# Patient Record
Sex: MALE | Race: BLACK OR AFRICAN AMERICAN | Employment: FULL TIME | URBAN - METROPOLITAN AREA
[De-identification: names, ages, dates, MRNs, and addresses within clinical notes are randomized per-mention and may not be internally consistent; named-entity substitution may affect disease eponyms.]

---

## 2017-10-17 ENCOUNTER — HOSPITAL ENCOUNTER (EMERGENCY)
Age: 32
Discharge: HOME | End: 2017-10-17
Attending: EMERGENCY MEDICINE | Admitting: EMERGENCY MEDICINE
Payer: SELF-PAY

## 2017-10-17 ENCOUNTER — APPOINTMENT (OUTPATIENT)
Dept: GENERAL RADIOLOGY | Age: 32
End: 2017-10-17
Attending: EMERGENCY MEDICINE
Payer: SELF-PAY

## 2017-10-17 ENCOUNTER — APPOINTMENT (OUTPATIENT)
Dept: CT IMAGING | Age: 32
End: 2017-10-17
Attending: EMERGENCY MEDICINE
Payer: SELF-PAY

## 2017-10-17 VITALS
DIASTOLIC BLOOD PRESSURE: 96 MMHG | TEMPERATURE: 98.7 F | HEIGHT: 64 IN | RESPIRATION RATE: 15 BRPM | SYSTOLIC BLOOD PRESSURE: 132 MMHG | BODY MASS INDEX: 33.29 KG/M2 | HEART RATE: 77 BPM | WEIGHT: 195 LBS | OXYGEN SATURATION: 98 %

## 2017-10-17 DIAGNOSIS — R05.8 POST-TUSSIVE SYNCOPE: Primary | ICD-10-CM

## 2017-10-17 DIAGNOSIS — Z72.0 TOBACCO ABUSE: ICD-10-CM

## 2017-10-17 LAB
ALBUMIN SERPL-MCNC: 3.4 G/DL (ref 3.5–5)
ALBUMIN/GLOB SERPL: 1 {RATIO} (ref 1.1–2.2)
ALP SERPL-CCNC: 82 U/L (ref 45–117)
ALT SERPL-CCNC: 34 U/L (ref 12–78)
ANION GAP SERPL CALC-SCNC: 9 MMOL/L (ref 5–15)
AST SERPL-CCNC: 16 U/L (ref 15–37)
ATRIAL RATE: 75 BPM
BASOPHILS # BLD: 0 K/UL (ref 0–0.1)
BASOPHILS NFR BLD: 0 % (ref 0–1)
BILIRUB SERPL-MCNC: 0.3 MG/DL (ref 0.2–1)
BUN SERPL-MCNC: 13 MG/DL (ref 6–20)
BUN/CREAT SERPL: 15 (ref 12–20)
CALCIUM SERPL-MCNC: 8.5 MG/DL (ref 8.5–10.1)
CALCULATED P AXIS, ECG09: 76 DEGREES
CALCULATED R AXIS, ECG10: 69 DEGREES
CALCULATED T AXIS, ECG11: 27 DEGREES
CHLORIDE SERPL-SCNC: 104 MMOL/L (ref 97–108)
CO2 SERPL-SCNC: 28 MMOL/L (ref 21–32)
CREAT SERPL-MCNC: 0.84 MG/DL (ref 0.7–1.3)
D DIMER PPP FEU-MCNC: 1 MG/L FEU (ref 0–0.65)
DIAGNOSIS, 93000: NORMAL
EOSINOPHIL # BLD: 0.2 K/UL (ref 0–0.4)
EOSINOPHIL NFR BLD: 2 % (ref 0–7)
ERYTHROCYTE [DISTWIDTH] IN BLOOD BY AUTOMATED COUNT: 14.1 % (ref 11.5–14.5)
GLOBULIN SER CALC-MCNC: 3.4 G/DL (ref 2–4)
GLUCOSE SERPL-MCNC: 109 MG/DL (ref 65–100)
HCT VFR BLD AUTO: 37.7 % (ref 36.6–50.3)
HGB BLD-MCNC: 12.6 G/DL (ref 12.1–17)
LYMPHOCYTES # BLD: 2 K/UL (ref 0.8–3.5)
LYMPHOCYTES NFR BLD: 30 % (ref 12–49)
MCH RBC QN AUTO: 30.1 PG (ref 26–34)
MCHC RBC AUTO-ENTMCNC: 33.4 G/DL (ref 30–36.5)
MCV RBC AUTO: 90 FL (ref 80–99)
MONOCYTES # BLD: 0.5 K/UL (ref 0–1)
MONOCYTES NFR BLD: 7 % (ref 5–13)
NEUTS SEG # BLD: 4.2 K/UL (ref 1.8–8)
NEUTS SEG NFR BLD: 61 % (ref 32–75)
P-R INTERVAL, ECG05: 168 MS
PLATELET # BLD AUTO: 295 K/UL (ref 150–400)
POTASSIUM SERPL-SCNC: 3.8 MMOL/L (ref 3.5–5.1)
PROT SERPL-MCNC: 6.8 G/DL (ref 6.4–8.2)
Q-T INTERVAL, ECG07: 384 MS
QRS DURATION, ECG06: 94 MS
QTC CALCULATION (BEZET), ECG08: 428 MS
RBC # BLD AUTO: 4.19 M/UL (ref 4.1–5.7)
SODIUM SERPL-SCNC: 141 MMOL/L (ref 136–145)
VENTRICULAR RATE, ECG03: 75 BPM
WBC # BLD AUTO: 6.8 K/UL (ref 4.1–11.1)

## 2017-10-17 PROCEDURE — 74011250636 HC RX REV CODE- 250/636: Performed by: EMERGENCY MEDICINE

## 2017-10-17 PROCEDURE — 96361 HYDRATE IV INFUSION ADD-ON: CPT

## 2017-10-17 PROCEDURE — 85379 FIBRIN DEGRADATION QUANT: CPT | Performed by: EMERGENCY MEDICINE

## 2017-10-17 PROCEDURE — 80053 COMPREHEN METABOLIC PANEL: CPT | Performed by: EMERGENCY MEDICINE

## 2017-10-17 PROCEDURE — 36415 COLL VENOUS BLD VENIPUNCTURE: CPT | Performed by: EMERGENCY MEDICINE

## 2017-10-17 PROCEDURE — 71275 CT ANGIOGRAPHY CHEST: CPT

## 2017-10-17 PROCEDURE — 74011636320 HC RX REV CODE- 636/320: Performed by: EMERGENCY MEDICINE

## 2017-10-17 PROCEDURE — 77030029684 HC NEB SM VOL KT MONA -A

## 2017-10-17 PROCEDURE — 93005 ELECTROCARDIOGRAM TRACING: CPT

## 2017-10-17 PROCEDURE — 94640 AIRWAY INHALATION TREATMENT: CPT

## 2017-10-17 PROCEDURE — 85025 COMPLETE CBC W/AUTO DIFF WBC: CPT | Performed by: EMERGENCY MEDICINE

## 2017-10-17 PROCEDURE — 99283 EMERGENCY DEPT VISIT LOW MDM: CPT

## 2017-10-17 PROCEDURE — 71020 XR CHEST PA LAT: CPT

## 2017-10-17 PROCEDURE — 74011000250 HC RX REV CODE- 250: Performed by: EMERGENCY MEDICINE

## 2017-10-17 PROCEDURE — 96374 THER/PROPH/DIAG INJ IV PUSH: CPT

## 2017-10-17 RX ORDER — IPRATROPIUM BROMIDE AND ALBUTEROL SULFATE 2.5; .5 MG/3ML; MG/3ML
3 SOLUTION RESPIRATORY (INHALATION)
Status: COMPLETED | OUTPATIENT
Start: 2017-10-17 | End: 2017-10-17

## 2017-10-17 RX ORDER — ALBUTEROL SULFATE 90 UG/1
2 AEROSOL, METERED RESPIRATORY (INHALATION)
Qty: 1 INHALER | Refills: 0 | Status: SHIPPED | OUTPATIENT
Start: 2017-10-17 | End: 2018-07-08

## 2017-10-17 RX ORDER — NAPROXEN 500 MG/1
500 TABLET ORAL
Qty: 20 TAB | Refills: 0 | Status: SHIPPED | OUTPATIENT
Start: 2017-10-17 | End: 2018-07-08

## 2017-10-17 RX ORDER — PREDNISONE 20 MG/1
60 TABLET ORAL DAILY
Qty: 15 TAB | Refills: 0 | Status: SHIPPED | OUTPATIENT
Start: 2017-10-17 | End: 2017-10-22

## 2017-10-17 RX ORDER — SODIUM CHLORIDE 0.9 % (FLUSH) 0.9 %
5-10 SYRINGE (ML) INJECTION
Status: COMPLETED | OUTPATIENT
Start: 2017-10-17 | End: 2017-10-17

## 2017-10-17 RX ORDER — KETOROLAC TROMETHAMINE 30 MG/ML
30 INJECTION, SOLUTION INTRAMUSCULAR; INTRAVENOUS
Status: COMPLETED | OUTPATIENT
Start: 2017-10-17 | End: 2017-10-17

## 2017-10-17 RX ADMIN — Medication 10 ML: at 13:55

## 2017-10-17 RX ADMIN — SODIUM CHLORIDE 1000 ML: 900 INJECTION, SOLUTION INTRAVENOUS at 11:53

## 2017-10-17 RX ADMIN — IOPAMIDOL 100 ML: 755 INJECTION, SOLUTION INTRAVENOUS at 13:55

## 2017-10-17 RX ADMIN — IPRATROPIUM BROMIDE AND ALBUTEROL SULFATE 3 ML: .5; 3 SOLUTION RESPIRATORY (INHALATION) at 14:51

## 2017-10-17 RX ADMIN — KETOROLAC TROMETHAMINE 30 MG: 30 INJECTION, SOLUTION INTRAMUSCULAR at 14:29

## 2017-10-17 NOTE — ED NOTES
Patient here with c/p syncope and collapse. Patient states episode happened yesterday and over one week ago. Patient reports he was smoking a cigarette when he felt a little dizzy, then states that he woke up on the ground being helped up by family. Patient states that he has had no other symptoms. Denies previous medical history. Denies fevers. Denies changes to diet and denies changes to bathroom patterns. Emergency Department Nursing Plan of Care       The Nursing Plan of Care is developed from the Nursing assessment and Emergency Department Attending provider initial evaluation. The plan of care may be reviewed in the ED Provider note.     The Plan of Care was developed with the following considerations:   Patient / Family readiness to learn indicated by:verbalized understanding  Persons(s) to be included in education: patient  Barriers to Learning/Limitations:No    Signed     Mary Ann Macias RN    10/17/2017   11:13 AM

## 2017-10-17 NOTE — DISCHARGE INSTRUCTIONS
Fainting: Care Instructions  Your Care Instructions    When you faint, or pass out, you lose consciousness for a short time. A brief drop in blood flow to the brain often causes it. When you fall or lie down, more blood flows to your brain and you regain consciousness. Emotional stress, pain, or overheating--especially if you have been standing--can make you faint. In these cases, fainting is usually not serious. But fainting can be a sign of a more serious problem. Your doctor may want you to have more tests to rule out other causes. The treatment you need depends on the reason why you fainted. The doctor has checked you carefully, but problems can develop later. If you notice any problems or new symptoms, get medical treatment right away. Follow-up care is a key part of your treatment and safety. Be sure to make and go to all appointments, and call your doctor if you are having problems. It's also a good idea to know your test results and keep a list of the medicines you take. How can you care for yourself at home? · Drink plenty of fluids to prevent dehydration. If you have kidney, heart, or liver disease and have to limit fluids, talk with your doctor before you increase your fluid intake. When should you call for help? Call 911 anytime you think you may need emergency care. For example, call if:  · You have symptoms of a heart problem. These may include:  ¨ Chest pain or pressure. ¨ Severe trouble breathing. ¨ A fast or irregular heartbeat. ¨ Lightheadedness or sudden weakness. ¨ Coughing up pink, foamy mucus. ¨ Passing out. After you call 911, the  may tell you to chew 1 adult-strength or 2 to 4 low-dose aspirin. Wait for an ambulance. Do not try to drive yourself. · You have symptoms of a stroke. These may include:  ¨ Sudden numbness, tingling, weakness, or loss of movement in your face, arm, or leg, especially on only one side of your body. ¨ Sudden vision changes.   ¨ Sudden trouble speaking. ¨ Sudden confusion or trouble understanding simple statements. ¨ Sudden problems with walking or balance. ¨ A sudden, severe headache that is different from past headaches. · You passed out (lost consciousness) again. Watch closely for changes in your health, and be sure to contact your doctor if:  · You do not get better as expected. Where can you learn more? Go to http://rosa-yamini.info/. Enter J625 in the search box to learn more about \"Fainting: Care Instructions. \"  Current as of: March 20, 2017  Content Version: 11.3  © 6545-9284 [employer]. Care instructions adapted under license by [employer] (which disclaims liability or warranty for this information). If you have questions about a medical condition or this instruction, always ask your healthcare professional. [address] any warranty or liability for your use of this information. Cough: Care Instructions  Your Care Instructions  A cough is your body's response to something that bothers your throat or airways. Many things can cause a cough. You might cough because of a cold or the flu, bronchitis, or asthma. Smoking, postnasal drip, allergies, and stomach acid that backs up into your throat also can cause coughs. A cough is a symptom, not a disease. Most coughs stop when the cause, such as a cold, goes away. You can take a few steps at home to cough less and feel better. Follow-up care is a key part of your treatment and safety. Be sure to make and go to all appointments, and call your doctor if you are having problems. It's also a good idea to know your test results and keep a list of the medicines you take. How can you care for yourself at home? · Drink lots of water and other fluids. This helps thin the mucus and soothes a dry or sore throat. Honey or lemon juice in hot water or tea may ease a dry cough.   · Take cough medicine as directed by your doctor. · Prop up your head on pillows to help you breathe and ease a dry cough. · Try cough drops to soothe a dry or sore throat. Cough drops don't stop a cough. Medicine-flavored cough drops are no better than candy-flavored drops or hard candy. · Do not smoke. Avoid secondhand smoke. If you need help quitting, talk to your doctor about stop-smoking programs and medicines. These can increase your chances of quitting for good. When should you call for help? Call 911 anytime you think you may need emergency care. For example, call if:  · You have severe trouble breathing. Call your doctor now or seek immediate medical care if:  · You cough up blood. · You have new or worse trouble breathing. · You have a new or higher fever. · You have a new rash. Watch closely for changes in your health, and be sure to contact your doctor if:  · You cough more deeply or more often, especially if you notice more mucus or a change in the color of your mucus. · You have new symptoms, such as a sore throat, an earache, or sinus pain. · You do not get better as expected. Where can you learn more? Go to http://rosa-yamini.info/. Enter D279 in the search box to learn more about \"Cough: Care Instructions. \"  Current as of: March 25, 2017  Content Version: 11.3  © 2282-0669 [employer]o. Care instructions adapted under license by [employer] (which disclaims liability or warranty for this information). If you have questions about a medical condition or this instruction, always ask your healthcare professional. [place] [address] any warranty or liability for your use of this information. Learning About Benefits From Quitting Smoking  How does quitting smoking make you healthier? If you're thinking about quitting smoking, you may have a few reasons to be smoke-free. Your health may be one of them.   · When you quit smoking, you lower your risks for cancer, lung disease, heart attack, stroke, blood vessel disease, and blindness from macular degeneration. · When you're smoke-free, you get sick less often, and you heal faster. You are less likely to get colds, flu, bronchitis, and pneumonia. · As a nonsmoker, you may find that your mood is better and you are less stressed. When and how will you feel healthier? Quitting has real health benefits that start from day 1 of being smoke-free. And the longer you stay smoke-free, the healthier you get and the better you feel. The first hours  · After just 20 minutes, your blood pressure and heart rate go down. That means there's less stress on your heart and blood vessels. · Within 12 hours, the level of carbon monoxide in your blood drops back to normal. That makes room for more oxygen. With more oxygen in your body, you may notice that you have more energy than when you smoked. After 2 weeks  · Your lungs start to work better. · Your risk of heart attack starts to drop. After 1 month  · When your lungs are clear, you cough less and breathe deeper, so it's easier to be active. · Your sense of taste and smell return. That means you can enjoy food more than you have since you started smoking. Over the years  · After 1 year, your risk of heart disease is half what it would be if you kept smoking. · After 5 years, your risk of stroke starts to shrink. Within a few years after that, it's about the same as if you'd never smoked. · After 10 years, your risk of dying from lung cancer is cut by about half. And your risk for many other types of cancer is lower too. How would quitting help others in your life? When you quit smoking, you improve the health of everyone who now breathes in your smoke. · Their heart, lung, and cancer risks drop, much like yours. · They are sick less. For babies and small children, living smoke-free means they're less likely to have ear infections, pneumonia, and bronchitis.   · If you're a woman who is or will be pregnant someday, quitting smoking means a healthier . · Children who are close to you are less likely to become adult smokers. Where can you learn more? Go to http://rosa-yamini.info/. Enter 052 [id number] [id number] 11 in the search box to learn more about \"Learning About Benefits From Quitting Smoking. \"  Current as of: 2017  Content Version: 11.3  © 7131-0445 R[employer]. Care instructions adapted under license by [employer] (which disclaims liability or warranty for this information). If you have questions about a medical condition or this instruction, always ask your healthcare professional. [place] [address] any warranty or liability for your use of this information. Stopping Smoking: Care Instructions  Your Care Instructions  Cigarette smokers crave the nicotine in cigarettes. Giving it up is much harder than simply changing a habit. Your body has to stop craving the nicotine. It is hard to quit, but you can do it. There are many tools that people use to quit smoking. You may find that combining tools works best for you. There are several steps to quitting. First you get ready to quit. Then you get support to help you. After that, you learn new skills and behaviors to become a nonsmoker. For many people, a necessary step is getting and using medicine. Your doctor will help you set up the plan that best meets your needs. You may want to attend a smoking cessation program to help you quit smoking. When you choose a program, look for one that has proven success. Ask your doctor for ideas. You will greatly increase your chances of success if you take medicine as well as get counseling or join a cessation program.  Some of the changes you feel when you first quit tobacco are uncomfortable. Your body will miss the nicotine at first, and you may feel short-tempered and grumpy. You may have trouble sleeping or concentrating.  Medicine can help you deal with these symptoms. You may struggle with changing your smoking habits and rituals. The last step is the tricky one: Be prepared for the smoking urge to continue for a time. This is a lot to deal with, but keep at it. You will feel better. Follow-up care is a key part of your treatment and safety. Be sure to make and go to all appointments, and call your doctor if you are having problems. Its also a good idea to know your test results and keep a list of the medicines you take. How can you care for yourself at home? · Ask your family, friends, and coworkers for support. You have a better chance of quitting if you have help and support. · Join a support group, such as Nicotine Anonymous, for people who are trying to quit smoking. · Consider signing up for a smoking cessation program, such as the American Lung Association's Freedom from Smoking program.  · Set a quit date. Pick your date carefully so that it is not right in the middle of a big deadline or stressful time. Once you quit, do not even take a puff. Get rid of all ashtrays and lighters after your last cigarette. Clean your house and your clothes so that they do not smell of smoke. · Learn how to be a nonsmoker. Think about ways you can avoid those things that make you reach for a cigarette. ¨ Avoid situations that put you at greatest risk for smoking. For some people, it is hard to have a drink with friends without smoking. For others, they might skip a coffee break with coworkers who smoke. ¨ Change your daily routine. Take a different route to work or eat a meal in a different place. · Cut down on stress. Calm yourself or release tension by doing an activity you enjoy, such as reading a book, taking a hot bath, or gardening. · Talk to your doctor or pharmacist about nicotine replacement therapy, which replaces the nicotine in your body.  You still get nicotine but you do not use tobacco. Nicotine replacement products help you slowly reduce the amount of nicotine you need. These products come in several forms, many of them available over-the-counter:  ¨ Nicotine patches  ¨ Nicotine gum and lozenges  ¨ Nicotine inhaler  · Ask your doctor about bupropion (Wellbutrin) or varenicline (Chantix), which are prescription medicines. They do not contain nicotine. They help you by reducing withdrawal symptoms, such as stress and anxiety. · Some people find hypnosis, acupuncture, and massage helpful for ending the smoking habit. · Eat a healthy diet and get regular exercise. Having healthy habits will help your body move past its craving for nicotine. · Be prepared to keep trying. Most people are not successful the first few times they try to quit. Do not get mad at yourself if you smoke again. Make a list of things you learned and think about when you want to try again, such as next week, next month, or next year. Where can you learn more? Go to http://rosa-yamini.info/. Enter S793 in the search box to learn more about \"Stopping Smoking: Care Instructions. \"  Current as of: March 20, 2017  Content Version: 11.3  © 2690-9177 [employer]. Care instructions adapted under license by [employer] (which disclaims liability or warranty for this information). If you have questions about a medical condition or this instruction, always ask your healthcare professional. [address] any warranty or liability for your use of this information.

## 2017-10-17 NOTE — ED NOTES
Patient (s)  given copy of dc instructions and  paper script(s) and 3 electronic scripts. Patient (s)  verbalized understanding of instructions and script (s). Patient given a current medication reconciliation form and verbalized understanding of their medications. Patient (s) verbalized understanding of the importance of discussing medications with his or her physician or clinic they will be following up with. Patient alert and oriented and in no acute distress. Patient offered wheelchair from treatment area to hospital entrance, patient decline wheelchair.

## 2017-10-17 NOTE — Clinical Note
[facility] Departments For adult and child immunizations, family planning, TB screening, STD testing and women's health services. [place]: [place] [phone] [employer] [phone] [facility]   [phone] [facility]   [phone] Anita Castañeda   [phone] [place]: [facility] [phone] [place] [phone] Kb Campos [phone] [address] For primary care services, woman and child wellness, and some clinic s providing specialty care. [facility] -- 1011 [facility]. [address] [place] [address] [phone] [facility] 200 [facility] [phone] 1328 [facility] 110 [facility] [phone] 1039 [facility] 5850 [facility]  [phone] 7700 [facility] [phone] [facility] 81 [facility] [phone] [facility] 1051 [facility], 809 [facility]: [facility] [address], ext [phone] [facility], [address] [phone] [facility] 37 [phone] [address] 5850 [facility]  [phone] [employer]  [address], [phone] 3553 [facility] ([email or web address]) [phone] Sexual Health/Woman Wellness Clinics For STD/HIV testing and treatment, pregnancy testing and services, men's health, birth control services and hepatitis/HPV vaccine services. [facility] [address] [phone] [facility] Quinten Malik [phone] [phone] [address] [place] [address] Rd, 5th floor [phone] [facility] [address] [phone] [employer] [facility] [employer] [address] [employer] [phone] Special[facility] [address] [place] [address] [phone] [address] [facility] [address]   [phone] [facility]   [phone] Women, Infant and Children's Services: [address] [phone] [address] [phone] 128 [facility] 66 [facility]     [phone] [facility]   [phone] 562 [facility] [phone] [facility] [phone] Rich Campbell M.D. Kendall Carney M.D. JONATHAN Booker M.D. MD Anastasiia Sky, FNP Shabana Low, TOÑITO Low, P TOÑITO Gibson MD Liza Glasgow, ROJELIO [address] [phone] April Renetta MD 
[facility] MD Bertha Gregg MD [facility] [phone] MD Renee Fournier MD Renaye Lace, MD [employer], MD Tom Henry, MD        [phone] Ximena Lang MD               [phone] Pawel Salinas MD      [phone] [facility] [phone] MD Ronna Lebron MD Rejeana Meyer, MD 
 [facility] [phone]

## 2017-10-17 NOTE — ED PROVIDER NOTES
[address] [place] [address]  EMERGENCY DEPARTMENT HISTORY AND PHYSICAL EXAM         Date of Service: 10/17/2017   Patient Name: Haile Lugo   YOB: 1985  Medical Record Number: [id number]    History of Presenting Illness     Chief Complaint   Patient presents with    Syncope        History Provided By:  patient    Additional History:   Haile Lugo is a 34 y.o. male who presents ambulatory to the ED with cc of posttussive syncopal episode yesterday. Pt states he was standing and talking to his brother when the he began coughing, and he fell to the floor and had to be awoken by his brother. Pt states he awoke this morning with moderate chest pain and tingling in his left arm. He notes a similar similar episode 2 weeks ago while he was smoking a cigarette and began coughing then lost consciousness. Pt denies any light headedness, dizziness, visual disturbance, or seizure. Social Hx: + Tobacco, + EtOH, - Illicit Drugs    There are no other complaints, changes or physical findings at this time. Primary Care Provider: None     Past History     Past Medical History:   History reviewed. No pertinent past medical history. Past Surgical History:   History reviewed. No pertinent surgical history. Family History:   History reviewed. No pertinent family history. Social History:   Social History   Substance Use Topics    Smoking status: Current Some Day Smoker     Packs/day: 1.00    Smokeless tobacco: Never Used    Alcohol use 3.5 oz/week     7 Cans of beer per week        Allergies: Allergies   Allergen Reactions    Pcn [Penicillins] Anaphylaxis        Review of Systems   Review of Systems   Constitutional: Negative for chills and fever. HENT: Negative for congestion, rhinorrhea, sneezing and sore throat. Eyes: Negative for redness and visual disturbance. Respiratory: Positive for cough. Negative for shortness of breath.     Cardiovascular: Positive for chest pain. Negative for leg swelling. Gastrointestinal: Negative for abdominal pain, nausea and vomiting. Genitourinary: Negative for difficulty urinating and frequency. Musculoskeletal: Negative for back pain, myalgias and neck stiffness. Skin: Negative for rash. Neurological: Positive for syncope. Negative for dizziness, weakness and headaches. Hematological: Negative for adenopathy. Physical Exam  Physical Exam   Constitutional: He is oriented to person, place, and time. He appears well-developed and well-nourished. HENT:   Head: Normocephalic and atraumatic. Mouth/Throat: Oropharynx is clear and moist and mucous membranes are normal.   Eyes: EOM are normal.   Neck: Normal range of motion and full passive range of motion without pain. Neck supple. Cardiovascular: Normal rate, regular rhythm, normal heart sounds, intact distal pulses and normal pulses. No murmur heard. Pulmonary/Chest: Effort normal. No respiratory distress. He has decreased breath sounds. He exhibits no tenderness. Decreased breath sounds in all lung fields    Abdominal: Soft. Normal appearance and bowel sounds are normal. There is no tenderness. There is no rebound and no guarding. Neurological: He is alert and oriented to person, place, and time. He has normal strength. Skin: Skin is warm, dry and intact. No rash noted. No erythema. Psychiatric: He has a normal mood and affect. His speech is normal and behavior is normal. Judgment and thought content normal.   Nursing note and vitals reviewed. Medical Decision Making   I am the first provider for this patient. I reviewed the vital signs, available nursing notes, past medical history, past surgical history, family history and social history. Provider Notes: DDx: vasovagal syncope, posttussive syncope, PE, pneumonia, AAA     TOBACCO COUNSELING:  Upon evaluation, pt expressed that they are a current tobacco user.  Pt has been counseled on the dangers of smoking and was encouraged to quit as soon as possible in order to decrease further risks to their health. Pt has conveyed their understanding of the risks involved should they continue to use tobacco products. ED Course:  11:23 AM   Initial assessment performed. The patients presenting problems have been discussed, and they are in agreement with the care plan formulated and outlined with them. I have encouraged them to ask questions as they arise throughout their visit. EKG interpretation: (Preliminary) 11:08 AM  Rhythm: normal sinus rhythm; and regular . Rate (approx.): 75; Axis: normal; P wave: normal; QRS interval: normal ; ST/T wave: normal;   Written by Charlie Smiley. Nestor Hanson, ED Scribe, as dictated by Yuko Lynch MD.     Labs reassuring. Will discharge with PCP follow up. Diagnostic Study Results   Labs -      Recent Results (from the past 12 hour(s))   EKG, 12 LEAD, INITIAL    Collection Time: 10/17/17 11:08 AM   Result Value Ref Range    Ventricular Rate 75 BPM    Atrial Rate 75 BPM    P-R Interval 168 ms    QRS Duration 94 ms    Q-T Interval 384 ms    QTC Calculation (Bezet) 428 ms    Calculated P Axis 76 degrees    Calculated R Axis 69 degrees    Calculated T Axis 27 degrees    Diagnosis       Normal sinus rhythm  Normal ECG  When compared with ECG of 27-JUN-2012 04:39,  No significant change was found     CBC WITH AUTOMATED DIFF    Collection Time: 10/17/17 11:48 AM   Result Value Ref Range    WBC 6.8 4.1 - 11.1 K/uL    RBC 4.19 4. 10 - 5.70 M/uL    HGB 12.6 12.1 - 17.0 g/dL    HCT 37.7 36.6 - 50.3 %    MCV 90.0 80.0 - 99.0 FL    MCH 30.1 26.0 - 34.0 PG    MCHC 33.4 30.0 - 36.5 g/dL    RDW 14.1 11.5 - 14.5 %    PLATELET 986 610 - 678 K/uL    NEUTROPHILS 61 32 - 75 %    LYMPHOCYTES 30 12 - 49 %    MONOCYTES 7 5 - 13 %    EOSINOPHILS 2 0 - 7 %    BASOPHILS 0 0 - 1 %    ABS. NEUTROPHILS 4.2 1.8 - 8.0 K/UL    ABS. LYMPHOCYTES 2.0 0.8 - 3.5 K/UL    ABS. MONOCYTES 0.5 0.0 - 1.0 K/UL    ABS. EOSINOPHILS 0.2 0.0 - 0.4 K/UL    ABS. BASOPHILS 0.0 0.0 - 0.1 K/UL   METABOLIC PANEL, COMPREHENSIVE    Collection Time: 10/17/17 11:48 AM   Result Value Ref Range    Sodium 141 136 - 145 mmol/L    Potassium 3.8 3.5 - 5.1 mmol/L    Chloride 104 97 - 108 mmol/L    CO2 28 21 - 32 mmol/L    Anion gap 9 5 - 15 mmol/L    Glucose 109 (H) 65 - 100 mg/dL    BUN 13 6 - 20 MG/DL    Creatinine 0.84 0.70 - 1.30 MG/DL    BUN/Creatinine ratio 15 12 - 20      GFR est AA >60 >60 ml/min/1.73m2    GFR est non-AA >60 >60 ml/min/1.73m2    Calcium 8.5 8.5 - 10.1 MG/DL    Bilirubin, total 0.3 0.2 - 1.0 MG/DL    ALT (SGPT) 34 12 - 78 U/L    AST (SGOT) 16 15 - 37 U/L    Alk. phosphatase 82 45 - 117 U/L    Protein, total 6.8 6.4 - 8.2 g/dL    Albumin 3.4 (L) 3.5 - 5.0 g/dL    Globulin 3.4 2.0 - 4.0 g/dL    A-G Ratio 1.0 (L) 1.1 - 2.2     D DIMER    Collection Time: 10/17/17 11:48 AM   Result Value Ref Range    D-dimer 1.00 (H) 0.00 - 0.65 mg/L FEU       Radiologic Studies -  The following have been ordered and reviewed:    CT Results  (Last 48 hours)               10/17/17 1355  CTA CHEST W OR W WO CONT Final result    Impression:  IMPRESSION: No evidence of pulmonary embolism               Narrative:  EXAM:  CTA CHEST W OR W WO CONT       INDICATION:   SOB, Cough, left chest pain, syncope, elevated D-Dimer, PE?       COMPARISON: 6.28.2012. CONTRAST:  100 mL of Isovue-370. TECHNIQUE:    Precontrast  images were obtained to localize the volume for acquisition. Multislice helical CT arteriography was performed from the diaphragm to the   thoracic inlet during uneventful rapid bolus intravenous contrast   administration. Lung and soft tissue windows were generated. Coronal and   sagittal images were generated and 3D post processing consisting of coronal   maximum intensity images was performed.   CT dose reduction was achieved through   use of a standardized protocol tailored for this examination and automatic   exposure control for dose modulation. FINDINGS:   The lungs are clear of mass, nodule, airspace disease or edema. The pulmonary arteries are well enhanced and no pulmonary emboli are identified. There is no mediastinal or hilar adenopathy or mass. The aorta enhances normally   without evidence of aneurysm or dissection. The visualized portions of the upper abdominal organs are normal.               CXR Results  (Last 48 hours)               10/17/17 1201  XR CHEST PA LAT Final result    Impression:  IMPRESSION: No acute process                       Narrative:  EXAM:  XR CHEST PA LAT       INDICATION:   Chest Pain, cough, SOB, syncope, pneumonia? COMPARISON: 2012. FINDINGS: PA and lateral radiographs of the chest demonstrate clear lungs. The   cardiac and mediastinal contours and pulmonary vascularity are normal.  The   bones and soft tissues are within normal limits. There is costochondral   calcification. Vital Signs-Reviewed the patient's vital signs. Patient Vitals for the past 12 hrs:   Temp Pulse Resp BP SpO2   10/17/17 1219 - 77 15 (!) 132/96 -   10/17/17 1056 98.7 °F (37.1 °C) 84 18 (!) 138/103 96 %       Medications Given in the ED:  Medications   albuterol-ipratropium (DUO-NEB) 2.5 MG-0.5 MG/3 ML (not administered)   sodium chloride 0.9 % bolus infusion 1,000 mL (1,000 mL IntraVENous New Bag 10/17/17 1153)   iopamidol (ISOVUE-370) 76 % injection 100 mL (100 mL IntraVENous Given 10/17/17 1355)   sodium chloride (NS) flush 5-10 mL (10 mL IntraVENous Given 10/17/17 1355)   ketorolac (TORADOL) injection 30 mg (30 mg IntraVENous Given 10/17/17 1429)       Diagnosis:  Clinical Impression:   1. Post-tussive syncope    2.  Tobacco abuse         Plan:  1:   Follow-up Information     Follow up With Details Comments Contact Ramses Carbone MD Call or one of the PCP's from the clinic list [address] [place] [address] [address] [facility] EMERGENCY DEPT  As needed, If symptoms worsen [address]          2:   Current Discharge Medication List      START taking these medications    Details   albuterol (PROVENTIL HFA, VENTOLIN HFA, PROAIR HFA) 90 mcg/actuation inhaler Take 2 Puffs by inhalation every four (4) hours as needed for Wheezing. Qty: 1 Inhaler, Refills: 0      naproxen (NAPROSYN) 500 mg tablet Take 1 Tab by mouth every twelve (12) hours as needed for Pain. Qty: 20 Tab, Refills: 0      predniSONE (DELTASONE) 20 mg tablet Take 3 Tabs by mouth daily for 5 days. Qty: 15 Tab, Refills: 0           Return to ED if worse. Disposition:    DISCHARGE NOTE  2:34 PM  The patient has been re-evaluated and is ready for discharge. Reviewed available results with patient. Counseled pt on diagnosis and care plan. Pt has expressed understanding, and all questions have been answered. Pt agrees with plan and agrees to follow up as recommended, or return to the ED if their symptoms worsen. Discharge instructions have been provided and explained to the pt, along with reasons to return to the ED. Attestations: This note is prepared by Tami Eng. Karlie Butler, acting as Scribe for Judi Haney MD.    Judi Haney MD: The scribe's documentation has been prepared under my direction and personally reviewed by me in its entirety. I confirm that the note above accurately reflects all work, treatment, procedures, and medical decision making performed by me.

## 2018-07-08 ENCOUNTER — HOSPITAL ENCOUNTER (EMERGENCY)
Age: 33
Discharge: HOME | End: 2018-07-08
Attending: EMERGENCY MEDICINE
Payer: SELF-PAY

## 2018-07-08 VITALS
TEMPERATURE: 97.8 F | OXYGEN SATURATION: 97 % | HEART RATE: 78 BPM | DIASTOLIC BLOOD PRESSURE: 101 MMHG | WEIGHT: 190 LBS | SYSTOLIC BLOOD PRESSURE: 144 MMHG | BODY MASS INDEX: 32.44 KG/M2 | RESPIRATION RATE: 18 BRPM | HEIGHT: 64 IN

## 2018-07-08 DIAGNOSIS — T14.8XXA SPRAIN AND STRAIN: Primary | ICD-10-CM

## 2018-07-08 PROCEDURE — 99282 EMERGENCY DEPT VISIT SF MDM: CPT

## 2018-07-08 RX ORDER — NAPROXEN 500 MG/1
500 TABLET ORAL 2 TIMES DAILY WITH MEALS
Qty: 20 TAB | Refills: 0 | Status: SHIPPED | OUTPATIENT
Start: 2018-07-08 | End: 2018-07-18

## 2018-07-08 RX ORDER — METHOCARBAMOL 500 MG/1
500 TABLET, FILM COATED ORAL 4 TIMES DAILY
Qty: 30 TAB | Refills: 0 | Status: SHIPPED | OUTPATIENT
Start: 2018-07-08 | End: 2019-01-11

## 2018-07-08 NOTE — LETTER
[facility] EMERGENCY DEPT 
[address] [facility] [address] [address] 
[phone] Work/School Note Date: 7/8/2018 To Whom It May concern: 
 
Yuval Mcgraw was seen and treated today in the emergency room by the following provider(s): 
Attending Provider: Homero Melchor MD 
Nurse Practitioner: Azar Quinonez NP. Yuval Mcgraw may return to work on July 11. Sincerely, Azar Quinonez NP
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
## 2018-07-09 NOTE — ED NOTES
Discharge instructions were given to the patient by ROJELIO Comer. The patient left the Emergency Department ambulatory, alert and oriented and in no acute distress with 2 prescriptions. The patient was encouraged to call or return to the ED for worsening issues or problems and was encouraged to schedule a follow up appointment for continuing care. The patient verbalized understanding of discharge instructions and prescriptions, all questions were answered. The patient has no further concerns at this time.

## 2018-07-09 NOTE — DISCHARGE INSTRUCTIONS
Back Strain: Care Instructions  Your Care Instructions    Back strain happens when you overstretch, or pull, a muscle in your back. You may hurt your back in an accident or when you exercise or lift something. Most back pain will get better with rest and time. You can take care of yourself at home to help your back heal.  Follow-up care is a key part of your treatment and safety. Be sure to make and go to all appointments, and call your doctor if you are having problems. It's also a good idea to know your test results and keep a list of the medicines you take. How can you care for yourself at home? · Try to stay as active as you can, but stop or reduce any activity that causes pain. · Put ice or a cold pack on the sore muscle for 10 to 20 minutes at a time to stop swelling. Try this every 1 to 2 hours for 3 days (when you are awake) or until the swelling goes down. Put a thin cloth between the ice pack and your skin. · After 2 or 3 days, apply a heating pad on low or a warm cloth to your back. Some doctors suggest that you go back and forth between hot and cold treatments. · Take pain medicines exactly as directed. ¨ If the doctor gave you a prescription medicine for pain, take it as prescribed. ¨ If you are not taking a prescription pain medicine, ask your doctor if you can take an over-the-counter medicine. · Try sleeping on your side with a pillow between your legs. Or put a pillow under your knees when you lie on your back. These measures can ease pain in your lower back. · Return to your usual level of activity slowly. When should you call for help? Call 911 anytime you think you may need emergency care. For example, call if:  ? · You are unable to move a leg at all. ?Call your doctor now or seek immediate medical care if:  ? · You have new or worse symptoms in your legs, belly, or buttocks. Symptoms may include:  ¨ Numbness or tingling. ¨ Weakness. ¨ Pain.    ? · You lose bladder or bowel control. ? Watch closely for changes in your health, and be sure to contact your doctor if you are not getting better as expected. Where can you learn more? Go to http://rosa-yamini.info/. Enter M853 in the search box to learn more about \"Back Strain: Care Instructions. \"  Current as of: March 21, 2017  Content Version: 11.4  © 4787-5790 [employer]. Care instructions adapted under license by [employer] (which disclaims liability or warranty for this information). If you have questions about a medical condition or this instruction, always ask your healthcare professional. [place] [address] any warranty or liability for your use of this information.

## 2018-10-30 ENCOUNTER — APPOINTMENT (OUTPATIENT)
Dept: GENERAL RADIOLOGY | Age: 33
End: 2018-10-30
Attending: EMERGENCY MEDICINE
Payer: COMMERCIAL

## 2018-10-30 ENCOUNTER — HOSPITAL ENCOUNTER (EMERGENCY)
Age: 33
Discharge: HOME | End: 2018-10-30
Attending: EMERGENCY MEDICINE | Admitting: EMERGENCY MEDICINE
Payer: COMMERCIAL

## 2018-10-30 VITALS
WEIGHT: 195.2 LBS | HEART RATE: 83 BPM | HEIGHT: 64 IN | DIASTOLIC BLOOD PRESSURE: 84 MMHG | SYSTOLIC BLOOD PRESSURE: 130 MMHG | TEMPERATURE: 97.9 F | OXYGEN SATURATION: 96 % | RESPIRATION RATE: 20 BRPM | BODY MASS INDEX: 33.32 KG/M2

## 2018-10-30 DIAGNOSIS — S52.135A CLOSED NONDISPLACED FRACTURE OF NECK OF LEFT RADIUS, INITIAL ENCOUNTER: Primary | ICD-10-CM

## 2018-10-30 PROCEDURE — A4565 SLINGS: HCPCS

## 2018-10-30 PROCEDURE — 73080 X-RAY EXAM OF ELBOW: CPT

## 2018-10-30 PROCEDURE — 73090 X-RAY EXAM OF FOREARM: CPT

## 2018-10-30 PROCEDURE — 99284 EMERGENCY DEPT VISIT MOD MDM: CPT

## 2018-10-30 PROCEDURE — 73060 X-RAY EXAM OF HUMERUS: CPT

## 2018-10-30 PROCEDURE — 74011250637 HC RX REV CODE- 250/637: Performed by: EMERGENCY MEDICINE

## 2018-10-30 PROCEDURE — 75810000053 HC SPLINT APPLICATION

## 2018-10-30 RX ORDER — IBUPROFEN 800 MG/1
800 TABLET ORAL
Qty: 20 TAB | Refills: 0 | Status: SHIPPED | OUTPATIENT
Start: 2018-10-30 | End: 2018-11-06

## 2018-10-30 RX ORDER — IBUPROFEN 400 MG/1
800 TABLET ORAL
Status: COMPLETED | OUTPATIENT
Start: 2018-10-30 | End: 2018-10-30

## 2018-10-30 RX ORDER — HYDROCODONE BITARTRATE AND ACETAMINOPHEN 5; 325 MG/1; MG/1
1 TABLET ORAL
Qty: 20 TAB | Refills: 0 | Status: SHIPPED | OUTPATIENT
Start: 2018-10-30 | End: 2019-01-11

## 2018-10-30 RX ADMIN — IBUPROFEN 800 MG: 400 TABLET ORAL at 20:14

## 2018-10-31 NOTE — DISCHARGE INSTRUCTIONS
Broken Arm: Care Instructions  Your Care Instructions  Fractures can range from a small, hairline crack, to a bone or bones broken into two or more pieces. Your treatment depends on how bad the break is. Your doctor may have put your arm in a splint or cast to allow it to heal or to keep it stable until you see another doctor. It may take weeks or months for your arm to heal. You can help your arm heal with some care at home. You heal best when you take good care of yourself. Eat a variety of healthy foods, and don't smoke. You may have had a sedative to help you relax. You may be unsteady after having sedation. It can take a few hours for the medicine's effects to wear off. Common side effects of sedation include nausea, vomiting, and feeling sleepy or tired. The doctor has checked you carefully, but problems can develop later. If you notice any problems or new symptoms, get medical treatment right away. Follow-up care is a key part of your treatment and safety. Be sure to make and go to all appointments, and call your doctor if you are having problems. It's also a good idea to know your test results and keep a list of the medicines you take. How can you care for yourself at home? · If the doctor gave you a sedative:  ? For 24 hours, don't do anything that requires attention to detail. It takes time for the medicine's effects to completely wear off.  ? For your safety, do not drive or operate any machinery that could be dangerous. Wait until the medicine wears off and you can think clearly and react easily. · Put ice or a cold pack on your arm for 10 to 20 minutes at a time. Try to do this every 1 to 2 hours for the next 3 days (when you are awake). Put a thin cloth between the ice and your cast or splint. Keep the cast or splint dry. · Follow the cast care instructions your doctor gives you. If you have a splint, do not take it off unless your doctor tells you to. · Be safe with medicines.  Take pain medicines exactly as directed. ? If the doctor gave you a prescription medicine for pain, take it as prescribed. ? If you are not taking a prescription pain medicine, ask your doctor if you can take an over-the-counter medicine. · Prop up your arm on pillows when you sit or lie down in the first few days after the injury. Keep the arm higher than the level of your heart. This will help reduce swelling. · Follow instructions for exercises to keep your arm strong. · Wiggle your fingers and wrist often to reduce swelling and stiffness. When should you call for help? Call 911 anytime you think you may need emergency care. For example, call if:    · You are very sleepy and you have trouble waking up.    Call your doctor now or seek immediate medical care if:    · You have new or worse nausea or vomiting.     · You have new or worse pain.     · Your hand or fingers are cool or pale or change color.     · Your cast or splint feels too tight.     · You have tingling, weakness, or numbness in your hand or fingers.    Watch closely for changes in your health, and be sure to contact your doctor if:    · You do not get better as expected.     · You have problems with your cast or splint. Where can you learn more? Go to http://rosa-yamini.info/. Enter H229 in the search box to learn more about \"Broken Arm: Care Instructions. \"  Current as of: November 29, 2017  Content Version: 11.8  © 2092-9574 [employer]. Care instructions adapted under license by [employer] (which disclaims liability or warranty for this information). If you have questions about a medical condition or this instruction, always ask your healthcare professional. [address] any warranty or liability for your use of this information.
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
## 2019-01-10 ENCOUNTER — HOSPITAL ENCOUNTER (EMERGENCY)
Age: 34
Discharge: OTHER INSTITUTION | End: 2019-01-11
Attending: EMERGENCY MEDICINE
Payer: COMMERCIAL

## 2019-01-10 ENCOUNTER — APPOINTMENT (OUTPATIENT)
Dept: GENERAL RADIOLOGY | Age: 34
End: 2019-01-10
Attending: EMERGENCY MEDICINE
Payer: COMMERCIAL

## 2019-01-10 ENCOUNTER — APPOINTMENT (OUTPATIENT)
Dept: CT IMAGING | Age: 34
End: 2019-01-10
Attending: EMERGENCY MEDICINE
Payer: COMMERCIAL

## 2019-01-10 DIAGNOSIS — E83.51 HYPOCALCEMIA: ICD-10-CM

## 2019-01-10 DIAGNOSIS — E87.0 HYPERNATREMIA: ICD-10-CM

## 2019-01-10 DIAGNOSIS — F10.921 ALCOHOL INTOXICATION WITH DELIRIUM (HCC): Primary | ICD-10-CM

## 2019-01-10 DIAGNOSIS — F19.10 POLYSUBSTANCE ABUSE (HCC): ICD-10-CM

## 2019-01-10 DIAGNOSIS — R41.0 DELIRIUM: ICD-10-CM

## 2019-01-10 DIAGNOSIS — E87.6 HYPOKALEMIA: ICD-10-CM

## 2019-01-10 DIAGNOSIS — E83.42 HYPOMAGNESEMIA: ICD-10-CM

## 2019-01-10 LAB
ALBUMIN SERPL-MCNC: 2.6 G/DL (ref 3.5–5)
ALBUMIN/GLOB SERPL: 1 {RATIO} (ref 1.1–2.2)
ALP SERPL-CCNC: 63 U/L (ref 45–117)
ALT SERPL-CCNC: 20 U/L (ref 12–78)
ANION GAP SERPL CALC-SCNC: 15 MMOL/L (ref 5–15)
APAP SERPL-MCNC: <2 UG/ML (ref 10–30)
AST SERPL-CCNC: 17 U/L (ref 15–37)
BASOPHILS # BLD: 0 K/UL (ref 0–0.1)
BASOPHILS NFR BLD: 0 % (ref 0–1)
BILIRUB SERPL-MCNC: 0.1 MG/DL (ref 0.2–1)
BUN SERPL-MCNC: 8 MG/DL (ref 6–20)
BUN/CREAT SERPL: 18 (ref 12–20)
CALCIUM SERPL-MCNC: 6.2 MG/DL (ref 8.5–10.1)
CHLORIDE SERPL-SCNC: 114 MMOL/L (ref 97–108)
CO2 SERPL-SCNC: 19 MMOL/L (ref 21–32)
COMMENT, HOLDF: NORMAL
CREAT SERPL-MCNC: 0.45 MG/DL (ref 0.7–1.3)
DIFFERENTIAL METHOD BLD: ABNORMAL
EOSINOPHIL # BLD: 0.1 K/UL (ref 0–0.4)
EOSINOPHIL NFR BLD: 1 % (ref 0–7)
ERYTHROCYTE [DISTWIDTH] IN BLOOD BY AUTOMATED COUNT: 14.4 % (ref 11.5–14.5)
ETHANOL SERPL-MCNC: 371 MG/DL
GLOBULIN SER CALC-MCNC: 2.6 G/DL (ref 2–4)
GLUCOSE SERPL-MCNC: 76 MG/DL (ref 65–100)
HCT VFR BLD AUTO: 39.9 % (ref 36.6–50.3)
HGB BLD-MCNC: 13.3 G/DL (ref 12.1–17)
IMM GRANULOCYTES # BLD AUTO: 0.1 K/UL (ref 0–0.04)
IMM GRANULOCYTES NFR BLD AUTO: 1 % (ref 0–0.5)
LIPASE SERPL-CCNC: 303 U/L (ref 73–393)
LYMPHOCYTES # BLD: 3.7 K/UL (ref 0.8–3.5)
LYMPHOCYTES NFR BLD: 38 % (ref 12–49)
MCH RBC QN AUTO: 30.3 PG (ref 26–34)
MCHC RBC AUTO-ENTMCNC: 33.3 G/DL (ref 30–36.5)
MCV RBC AUTO: 90.9 FL (ref 80–99)
MONOCYTES # BLD: 0.9 K/UL (ref 0–1)
MONOCYTES NFR BLD: 9 % (ref 5–13)
NEUTS SEG # BLD: 5 K/UL (ref 1.8–8)
NEUTS SEG NFR BLD: 51 % (ref 32–75)
NRBC # BLD: 0 K/UL (ref 0–0.01)
NRBC BLD-RTO: 0 PER 100 WBC
PLATELET # BLD AUTO: 281 K/UL (ref 150–400)
PMV BLD AUTO: 8.5 FL (ref 8.9–12.9)
POTASSIUM SERPL-SCNC: 2.7 MMOL/L (ref 3.5–5.1)
PROT SERPL-MCNC: 5.2 G/DL (ref 6.4–8.2)
RBC # BLD AUTO: 4.39 M/UL (ref 4.1–5.7)
SALICYLATES SERPL-MCNC: 3.5 MG/DL (ref 2.8–20)
SAMPLES BEING HELD,HOLD: NORMAL
SODIUM SERPL-SCNC: 148 MMOL/L (ref 136–145)
WBC # BLD AUTO: 9.7 K/UL (ref 4.1–11.1)

## 2019-01-10 PROCEDURE — 74011250636 HC RX REV CODE- 250/636: Performed by: EMERGENCY MEDICINE

## 2019-01-10 PROCEDURE — 74011000250 HC RX REV CODE- 250: Performed by: EMERGENCY MEDICINE

## 2019-01-10 PROCEDURE — 80307 DRUG TEST PRSMV CHEM ANLYZR: CPT

## 2019-01-10 PROCEDURE — 36415 COLL VENOUS BLD VENIPUNCTURE: CPT

## 2019-01-10 PROCEDURE — 96375 TX/PRO/DX INJ NEW DRUG ADDON: CPT

## 2019-01-10 PROCEDURE — 85025 COMPLETE CBC W/AUTO DIFF WBC: CPT

## 2019-01-10 PROCEDURE — 93005 ELECTROCARDIOGRAM TRACING: CPT

## 2019-01-10 PROCEDURE — 96361 HYDRATE IV INFUSION ADD-ON: CPT

## 2019-01-10 PROCEDURE — 80053 COMPREHEN METABOLIC PANEL: CPT

## 2019-01-10 PROCEDURE — 83735 ASSAY OF MAGNESIUM: CPT

## 2019-01-10 PROCEDURE — 99285 EMERGENCY DEPT VISIT HI MDM: CPT

## 2019-01-10 PROCEDURE — 83690 ASSAY OF LIPASE: CPT

## 2019-01-10 RX ORDER — POTASSIUM CHLORIDE 7.45 MG/ML
10 INJECTION INTRAVENOUS
Status: DISCONTINUED | OUTPATIENT
Start: 2019-01-10 | End: 2019-01-11 | Stop reason: HOSPADM

## 2019-01-10 RX ORDER — MAGNESIUM SULFATE HEPTAHYDRATE 40 MG/ML
2 INJECTION, SOLUTION INTRAVENOUS
Status: COMPLETED | OUTPATIENT
Start: 2019-01-10 | End: 2019-01-11

## 2019-01-10 RX ORDER — ONDANSETRON 2 MG/ML
8 INJECTION INTRAMUSCULAR; INTRAVENOUS
Status: COMPLETED | OUTPATIENT
Start: 2019-01-10 | End: 2019-01-10

## 2019-01-10 RX ORDER — CALCIUM GLUCONATE 94 MG/ML
1 INJECTION, SOLUTION INTRAVENOUS
Status: COMPLETED | OUTPATIENT
Start: 2019-01-10 | End: 2019-01-11

## 2019-01-10 RX ORDER — LORAZEPAM 2 MG/ML
0.5 INJECTION INTRAMUSCULAR
Status: COMPLETED | OUTPATIENT
Start: 2019-01-10 | End: 2019-01-10

## 2019-01-10 RX ADMIN — SODIUM CHLORIDE 1000 ML: 900 INJECTION, SOLUTION INTRAVENOUS at 22:00

## 2019-01-10 RX ADMIN — ONDANSETRON 8 MG: 2 INJECTION INTRAMUSCULAR; INTRAVENOUS at 23:28

## 2019-01-10 RX ADMIN — FOLIC ACID: 5 INJECTION, SOLUTION INTRAMUSCULAR; INTRAVENOUS; SUBCUTANEOUS at 22:23

## 2019-01-10 RX ADMIN — LORAZEPAM 0.5 MG: 2 INJECTION, SOLUTION INTRAMUSCULAR; INTRAVENOUS at 22:56

## 2019-01-11 ENCOUNTER — APPOINTMENT (OUTPATIENT)
Dept: GENERAL RADIOLOGY | Age: 34
End: 2019-01-11
Attending: EMERGENCY MEDICINE
Payer: COMMERCIAL

## 2019-01-11 ENCOUNTER — APPOINTMENT (OUTPATIENT)
Dept: CT IMAGING | Age: 34
End: 2019-01-11
Attending: EMERGENCY MEDICINE
Payer: COMMERCIAL

## 2019-01-11 ENCOUNTER — APPOINTMENT (OUTPATIENT)
Dept: GENERAL RADIOLOGY | Age: 34
DRG: 896 | End: 2019-01-11
Attending: EMERGENCY MEDICINE
Payer: COMMERCIAL

## 2019-01-11 ENCOUNTER — HOSPITAL ENCOUNTER (INPATIENT)
Age: 34
LOS: 1 days | Discharge: HOME | DRG: 896 | End: 2019-01-12
Attending: EMERGENCY MEDICINE | Admitting: HOSPITALIST
Payer: COMMERCIAL

## 2019-01-11 VITALS
DIASTOLIC BLOOD PRESSURE: 61 MMHG | OXYGEN SATURATION: 99 % | RESPIRATION RATE: 17 BRPM | TEMPERATURE: 97.6 F | HEART RATE: 90 BPM | SYSTOLIC BLOOD PRESSURE: 132 MMHG

## 2019-01-11 DIAGNOSIS — R41.82 ALTERED MENTAL STATUS, UNSPECIFIED ALTERED MENTAL STATUS TYPE: Primary | ICD-10-CM

## 2019-01-11 DIAGNOSIS — F10.221: ICD-10-CM

## 2019-01-11 DIAGNOSIS — E83.42 HYPOMAGNESEMIA: ICD-10-CM

## 2019-01-11 DIAGNOSIS — E87.6 HYPOKALEMIA: ICD-10-CM

## 2019-01-11 DIAGNOSIS — F19.10 POLYSUBSTANCE ABUSE (HCC): ICD-10-CM

## 2019-01-11 PROBLEM — F10.929 ALCOHOL INTOXICATION (HCC): Status: ACTIVE | Noted: 2019-01-11

## 2019-01-11 LAB
ALBUMIN SERPL-MCNC: 3.5 G/DL (ref 3.5–5)
ALBUMIN/GLOB SERPL: 1 {RATIO} (ref 1.1–2.2)
ALP SERPL-CCNC: 82 U/L (ref 45–117)
ALT SERPL-CCNC: 30 U/L (ref 12–78)
AMPHET UR QL SCN: NEGATIVE
AMPHET UR QL SCN: NEGATIVE
ANION GAP SERPL CALC-SCNC: 8 MMOL/L (ref 5–15)
APPEARANCE UR: CLEAR
ARTERIAL PATENCY WRIST A: YES
AST SERPL-CCNC: 35 U/L (ref 15–37)
ATRIAL RATE: 76 BPM
ATRIAL RATE: 96 BPM
BACTERIA URNS QL MICRO: NEGATIVE /HPF
BARBITURATES UR QL SCN: NEGATIVE
BARBITURATES UR QL SCN: NEGATIVE
BASE DEFICIT BLD-SCNC: 4 MMOL/L
BASOPHILS # BLD: 0 K/UL (ref 0–0.1)
BASOPHILS NFR BLD: 0 % (ref 0–1)
BDY SITE: ABNORMAL
BENZODIAZ UR QL: NEGATIVE
BENZODIAZ UR QL: POSITIVE
BILIRUB SERPL-MCNC: 0.2 MG/DL (ref 0.2–1)
BILIRUB UR QL: NEGATIVE
BUN SERPL-MCNC: 7 MG/DL (ref 6–20)
BUN/CREAT SERPL: 11 (ref 12–20)
CALCIUM SERPL-MCNC: 7.4 MG/DL (ref 8.5–10.1)
CALCULATED P AXIS, ECG09: 66 DEGREES
CALCULATED P AXIS, ECG09: 89 DEGREES
CALCULATED R AXIS, ECG10: 100 DEGREES
CALCULATED R AXIS, ECG10: 60 DEGREES
CALCULATED T AXIS, ECG11: 44 DEGREES
CALCULATED T AXIS, ECG11: 50 DEGREES
CANNABINOIDS UR QL SCN: POSITIVE
CANNABINOIDS UR QL SCN: POSITIVE
CHLORIDE SERPL-SCNC: 110 MMOL/L (ref 97–108)
CO2 SERPL-SCNC: 25 MMOL/L (ref 21–32)
COCAINE UR QL SCN: NEGATIVE
COCAINE UR QL SCN: NEGATIVE
COLOR UR: ABNORMAL
COMMENT, HOLDF: NORMAL
CREAT SERPL-MCNC: 0.61 MG/DL (ref 0.7–1.3)
DIAGNOSIS, 93000: NORMAL
DIAGNOSIS, 93000: NORMAL
DIFFERENTIAL METHOD BLD: ABNORMAL
DRUG SCRN COMMENT,DRGCM: ABNORMAL
DRUG SCRN COMMENT,DRGCM: ABNORMAL
EOSINOPHIL # BLD: 0 K/UL (ref 0–0.4)
EOSINOPHIL NFR BLD: 0 % (ref 0–7)
EPITH CASTS URNS QL MICRO: ABNORMAL /LPF
ERYTHROCYTE [DISTWIDTH] IN BLOOD BY AUTOMATED COUNT: 14.4 % (ref 11.5–14.5)
ETHANOL SERPL-MCNC: 162 MG/DL
GAS FLOW.O2 O2 DELIVERY SYS: ABNORMAL L/MIN
GAS FLOW.O2 SETTING OXYMISER: 15 BPM
GLOBULIN SER CALC-MCNC: 3.6 G/DL (ref 2–4)
GLUCOSE BLD STRIP.AUTO-MCNC: 111 MG/DL (ref 65–100)
GLUCOSE SERPL-MCNC: 96 MG/DL (ref 65–100)
GLUCOSE UR STRIP.AUTO-MCNC: NEGATIVE MG/DL
HCO3 BLD-SCNC: 22.1 MMOL/L (ref 22–26)
HCT VFR BLD AUTO: 41.5 % (ref 36.6–50.3)
HGB BLD-MCNC: 13.5 G/DL (ref 12.1–17)
HGB UR QL STRIP: ABNORMAL
HYALINE CASTS URNS QL MICRO: ABNORMAL /LPF (ref 0–5)
IMM GRANULOCYTES # BLD AUTO: 0.1 K/UL (ref 0–0.04)
IMM GRANULOCYTES NFR BLD AUTO: 1 % (ref 0–0.5)
KETONES UR QL STRIP.AUTO: NEGATIVE MG/DL
LEUKOCYTE ESTERASE UR QL STRIP.AUTO: NEGATIVE
LYMPHOCYTES # BLD: 1.5 K/UL (ref 0.8–3.5)
LYMPHOCYTES NFR BLD: 19 % (ref 12–49)
MAGNESIUM SERPL-MCNC: 1.5 MG/DL (ref 1.6–2.4)
MAGNESIUM SERPL-MCNC: 2.2 MG/DL (ref 1.6–2.4)
MCH RBC QN AUTO: 30.5 PG (ref 26–34)
MCHC RBC AUTO-ENTMCNC: 32.5 G/DL (ref 30–36.5)
MCV RBC AUTO: 93.7 FL (ref 80–99)
METHADONE UR QL: NEGATIVE
METHADONE UR QL: NEGATIVE
MONOCYTES # BLD: 0.5 K/UL (ref 0–1)
MONOCYTES NFR BLD: 6 % (ref 5–13)
NEUTS SEG # BLD: 5.7 K/UL (ref 1.8–8)
NEUTS SEG NFR BLD: 74 % (ref 32–75)
NITRITE UR QL STRIP.AUTO: NEGATIVE
NRBC # BLD: 0 K/UL (ref 0–0.01)
NRBC BLD-RTO: 0 PER 100 WBC
O2/TOTAL GAS SETTING VFR VENT: 40 %
OPIATES UR QL: NEGATIVE
OPIATES UR QL: NEGATIVE
P-R INTERVAL, ECG05: 142 MS
P-R INTERVAL, ECG05: 172 MS
PCO2 BLD: 44.5 MMHG (ref 35–45)
PCP UR QL: NEGATIVE
PCP UR QL: NEGATIVE
PEEP RESPIRATORY: 5 CMH2O
PH BLD: 7.3 [PH] (ref 7.35–7.45)
PH UR STRIP: 7 [PH] (ref 5–8)
PLATELET # BLD AUTO: 214 K/UL (ref 150–400)
PMV BLD AUTO: 8.1 FL (ref 8.9–12.9)
PO2 BLD: 152 MMHG (ref 80–100)
POTASSIUM SERPL-SCNC: 4 MMOL/L (ref 3.5–5.1)
PROT SERPL-MCNC: 7.1 G/DL (ref 6.4–8.2)
PROT UR STRIP-MCNC: NEGATIVE MG/DL
Q-T INTERVAL, ECG07: 362 MS
Q-T INTERVAL, ECG07: 418 MS
QRS DURATION, ECG06: 100 MS
QRS DURATION, ECG06: 98 MS
QTC CALCULATION (BEZET), ECG08: 457 MS
QTC CALCULATION (BEZET), ECG08: 470 MS
RBC # BLD AUTO: 4.43 M/UL (ref 4.1–5.7)
RBC #/AREA URNS HPF: ABNORMAL /HPF (ref 0–5)
RBC MORPH BLD: ABNORMAL
SAMPLES BEING HELD,HOLD: NORMAL
SAO2 % BLD: 99 % (ref 92–97)
SERVICE CMNT-IMP: ABNORMAL
SODIUM SERPL-SCNC: 143 MMOL/L (ref 136–145)
SP GR UR REFRACTOMETRY: 1.01 (ref 1–1.03)
SPECIMEN TYPE: ABNORMAL
UA: UC IF INDICATED,UAUC: ABNORMAL
UROBILINOGEN UR QL STRIP.AUTO: 0.2 EU/DL (ref 0.2–1)
VENTILATION MODE VENT: ABNORMAL
VENTRICULAR RATE, ECG03: 76 BPM
VENTRICULAR RATE, ECG03: 96 BPM
VT SETTING VENT: 500 ML
WBC # BLD AUTO: 7.8 K/UL (ref 4.1–11.1)
WBC URNS QL MICRO: ABNORMAL /HPF (ref 0–4)

## 2019-01-11 PROCEDURE — 74011000250 HC RX REV CODE- 250: Performed by: EMERGENCY MEDICINE

## 2019-01-11 PROCEDURE — 94760 N-INVAS EAR/PLS OXIMETRY 1: CPT

## 2019-01-11 PROCEDURE — 74011000258 HC RX REV CODE- 258: Performed by: INTERNAL MEDICINE

## 2019-01-11 PROCEDURE — 65610000006 HC RM INTENSIVE CARE

## 2019-01-11 PROCEDURE — 99285 EMERGENCY DEPT VISIT HI MDM: CPT

## 2019-01-11 PROCEDURE — 96376 TX/PRO/DX INJ SAME DRUG ADON: CPT

## 2019-01-11 PROCEDURE — 96372 THER/PROPH/DIAG INJ SC/IM: CPT

## 2019-01-11 PROCEDURE — 96366 THER/PROPH/DIAG IV INF ADDON: CPT

## 2019-01-11 PROCEDURE — 74011000250 HC RX REV CODE- 250

## 2019-01-11 PROCEDURE — 77030034848

## 2019-01-11 PROCEDURE — 74011250636 HC RX REV CODE- 250/636: Performed by: EMERGENCY MEDICINE

## 2019-01-11 PROCEDURE — 96375 TX/PRO/DX INJ NEW DRUG ADDON: CPT

## 2019-01-11 PROCEDURE — 77030032490 HC SLV COMPR SCD KNE COVD -B

## 2019-01-11 PROCEDURE — 82803 BLOOD GASES ANY COMBINATION: CPT

## 2019-01-11 PROCEDURE — 77030008771 HC TU NG SALEM SUMP -A

## 2019-01-11 PROCEDURE — 96365 THER/PROPH/DIAG IV INF INIT: CPT

## 2019-01-11 PROCEDURE — 77010033678 HC OXYGEN DAILY

## 2019-01-11 PROCEDURE — 70450 CT HEAD/BRAIN W/O DYE: CPT

## 2019-01-11 PROCEDURE — 94002 VENT MGMT INPAT INIT DAY: CPT

## 2019-01-11 PROCEDURE — 96368 THER/DIAG CONCURRENT INF: CPT

## 2019-01-11 PROCEDURE — 36415 COLL VENOUS BLD VENIPUNCTURE: CPT

## 2019-01-11 PROCEDURE — 74011000250 HC RX REV CODE- 250: Performed by: INTERNAL MEDICINE

## 2019-01-11 PROCEDURE — 80307 DRUG TEST PRSMV CHEM ANLYZR: CPT

## 2019-01-11 PROCEDURE — 36600 WITHDRAWAL OF ARTERIAL BLOOD: CPT

## 2019-01-11 PROCEDURE — 0BH17EZ INSERTION OF ENDOTRACHEAL AIRWAY INTO TRACHEA, VIA NATURAL OR ARTIFICIAL OPENING: ICD-10-PCS | Performed by: EMERGENCY MEDICINE

## 2019-01-11 PROCEDURE — 74011250636 HC RX REV CODE- 250/636: Performed by: HOSPITALIST

## 2019-01-11 PROCEDURE — 31500 INSERT EMERGENCY AIRWAY: CPT

## 2019-01-11 PROCEDURE — 81001 URINALYSIS AUTO W/SCOPE: CPT

## 2019-01-11 PROCEDURE — 74011000258 HC RX REV CODE- 258: Performed by: EMERGENCY MEDICINE

## 2019-01-11 PROCEDURE — 82962 GLUCOSE BLOOD TEST: CPT

## 2019-01-11 PROCEDURE — 80053 COMPREHEN METABOLIC PANEL: CPT

## 2019-01-11 PROCEDURE — 74011250636 HC RX REV CODE- 250/636: Performed by: INTERNAL MEDICINE

## 2019-01-11 PROCEDURE — 83735 ASSAY OF MAGNESIUM: CPT

## 2019-01-11 PROCEDURE — 93005 ELECTROCARDIOGRAM TRACING: CPT

## 2019-01-11 PROCEDURE — 77030008683 HC TU ET CUF COVD -A

## 2019-01-11 PROCEDURE — 85025 COMPLETE CBC W/AUTO DIFF WBC: CPT

## 2019-01-11 PROCEDURE — 5A1935Z RESPIRATORY VENTILATION, LESS THAN 24 CONSECUTIVE HOURS: ICD-10-PCS | Performed by: EMERGENCY MEDICINE

## 2019-01-11 PROCEDURE — 71045 X-RAY EXAM CHEST 1 VIEW: CPT

## 2019-01-11 RX ORDER — SODIUM CHLORIDE 9 MG/ML
75 INJECTION, SOLUTION INTRAVENOUS CONTINUOUS
Status: DISCONTINUED | OUTPATIENT
Start: 2019-01-11 | End: 2019-01-12 | Stop reason: HOSPADM

## 2019-01-11 RX ORDER — LORAZEPAM 2 MG/ML
1 INJECTION INTRAMUSCULAR
Status: COMPLETED | OUTPATIENT
Start: 2019-01-11 | End: 2019-01-11

## 2019-01-11 RX ORDER — LORAZEPAM 2 MG/ML
4 INJECTION INTRAMUSCULAR
Status: DISCONTINUED | OUTPATIENT
Start: 2019-01-11 | End: 2019-01-12 | Stop reason: HOSPADM

## 2019-01-11 RX ORDER — HALOPERIDOL 5 MG/ML
5 INJECTION INTRAMUSCULAR
Status: COMPLETED | OUTPATIENT
Start: 2019-01-11 | End: 2019-01-11

## 2019-01-11 RX ORDER — HYDRALAZINE HYDROCHLORIDE 20 MG/ML
10 INJECTION INTRAMUSCULAR; INTRAVENOUS
Status: DISCONTINUED | OUTPATIENT
Start: 2019-01-11 | End: 2019-01-12 | Stop reason: HOSPADM

## 2019-01-11 RX ORDER — CHLORHEXIDINE GLUCONATE 1.2 MG/ML
15 RINSE ORAL 2 TIMES DAILY
Status: DISCONTINUED | OUTPATIENT
Start: 2019-01-11 | End: 2019-01-12 | Stop reason: HOSPADM

## 2019-01-11 RX ORDER — KETAMINE HYDROCHLORIDE 50 MG/ML
60 INJECTION, SOLUTION INTRAMUSCULAR; INTRAVENOUS ONCE
Status: COMPLETED | OUTPATIENT
Start: 2019-01-11 | End: 2019-01-11

## 2019-01-11 RX ORDER — MIDAZOLAM IN 0.9 % SOD.CHLORID 1 MG/ML
5 PLASTIC BAG, INJECTION (ML) INTRAVENOUS
Status: DISCONTINUED | OUTPATIENT
Start: 2019-01-11 | End: 2019-01-12 | Stop reason: HOSPADM

## 2019-01-11 RX ORDER — MIDAZOLAM HYDROCHLORIDE 1 MG/ML
5 INJECTION, SOLUTION INTRAMUSCULAR; INTRAVENOUS
Status: COMPLETED | OUTPATIENT
Start: 2019-01-11 | End: 2019-01-11

## 2019-01-11 RX ORDER — LORAZEPAM 2 MG/ML
0.5 INJECTION INTRAMUSCULAR
Status: COMPLETED | OUTPATIENT
Start: 2019-01-11 | End: 2019-01-11

## 2019-01-11 RX ORDER — KETAMINE HYDROCHLORIDE 50 MG/ML
175 INJECTION, SOLUTION INTRAMUSCULAR; INTRAVENOUS
Status: COMPLETED | OUTPATIENT
Start: 2019-01-11 | End: 2019-01-11

## 2019-01-11 RX ORDER — KETAMINE HYDROCHLORIDE 100 MG/ML
175 INJECTION, SOLUTION INTRAMUSCULAR; INTRAVENOUS
Status: DISCONTINUED | OUTPATIENT
Start: 2019-01-11 | End: 2019-01-11 | Stop reason: HOSPADM

## 2019-01-11 RX ORDER — DIPHENHYDRAMINE HYDROCHLORIDE 50 MG/ML
50 INJECTION, SOLUTION INTRAMUSCULAR; INTRAVENOUS
Status: DISCONTINUED | OUTPATIENT
Start: 2019-01-11 | End: 2019-01-11 | Stop reason: HOSPADM

## 2019-01-11 RX ORDER — LORAZEPAM 2 MG/ML
2 INJECTION INTRAMUSCULAR
Status: COMPLETED | OUTPATIENT
Start: 2019-01-11 | End: 2019-01-11

## 2019-01-11 RX ORDER — LIDOCAINE HCL/PF 100 MG/5ML
100 SYRINGE (ML) INTRAVENOUS ONCE
Status: COMPLETED | OUTPATIENT
Start: 2019-01-11 | End: 2019-01-11

## 2019-01-11 RX ORDER — MIDAZOLAM HYDROCHLORIDE 1 MG/ML
5 INJECTION, SOLUTION INTRAMUSCULAR; INTRAVENOUS
Status: ACTIVE | OUTPATIENT
Start: 2019-01-11 | End: 2019-01-11

## 2019-01-11 RX ORDER — DIPHENHYDRAMINE HYDROCHLORIDE 50 MG/ML
50 INJECTION, SOLUTION INTRAMUSCULAR; INTRAVENOUS
Status: COMPLETED | OUTPATIENT
Start: 2019-01-11 | End: 2019-01-11

## 2019-01-11 RX ORDER — POTASSIUM CHLORIDE 7.45 MG/ML
10 INJECTION INTRAVENOUS
Status: COMPLETED | OUTPATIENT
Start: 2019-01-11 | End: 2019-01-11

## 2019-01-11 RX ORDER — POTASSIUM CHLORIDE 7.45 MG/ML
10 INJECTION INTRAVENOUS
Status: DISCONTINUED | OUTPATIENT
Start: 2019-01-11 | End: 2019-01-11 | Stop reason: HOSPADM

## 2019-01-11 RX ORDER — ETOMIDATE 2 MG/ML
140 INJECTION INTRAVENOUS
Status: ACTIVE | OUTPATIENT
Start: 2019-01-11 | End: 2019-01-11

## 2019-01-11 RX ORDER — KETAMINE HYDROCHLORIDE 50 MG/ML
INJECTION, SOLUTION INTRAMUSCULAR; INTRAVENOUS
Status: COMPLETED
Start: 2019-01-11 | End: 2019-01-11

## 2019-01-11 RX ORDER — SUCCINYLCHOLINE CHLORIDE 20 MG/ML
140 INJECTION INTRAMUSCULAR; INTRAVENOUS
Status: COMPLETED | OUTPATIENT
Start: 2019-01-11 | End: 2019-01-11

## 2019-01-11 RX ORDER — KETAMINE HYDROCHLORIDE 50 MG/ML
120 INJECTION, SOLUTION INTRAMUSCULAR; INTRAVENOUS
Status: COMPLETED | OUTPATIENT
Start: 2019-01-11 | End: 2019-01-11

## 2019-01-11 RX ORDER — LORAZEPAM 2 MG/ML
2 INJECTION INTRAMUSCULAR
Status: DISCONTINUED | OUTPATIENT
Start: 2019-01-11 | End: 2019-01-12 | Stop reason: HOSPADM

## 2019-01-11 RX ORDER — MAGNESIUM SULFATE HEPTAHYDRATE 40 MG/ML
2 INJECTION, SOLUTION INTRAVENOUS ONCE
Status: COMPLETED | OUTPATIENT
Start: 2019-01-11 | End: 2019-01-11

## 2019-01-11 RX ORDER — ONDANSETRON 2 MG/ML
4 INJECTION INTRAMUSCULAR; INTRAVENOUS
Status: DISCONTINUED | OUTPATIENT
Start: 2019-01-11 | End: 2019-01-12 | Stop reason: HOSPADM

## 2019-01-11 RX ORDER — IBUPROFEN 200 MG
1 TABLET ORAL DAILY
Status: DISCONTINUED | OUTPATIENT
Start: 2019-01-11 | End: 2019-01-12 | Stop reason: HOSPADM

## 2019-01-11 RX ORDER — KETAMINE HYDROCHLORIDE 100 MG/ML
175 INJECTION, SOLUTION INTRAMUSCULAR; INTRAVENOUS
Status: DISCONTINUED | OUTPATIENT
Start: 2019-01-11 | End: 2019-01-11 | Stop reason: SDUPTHER

## 2019-01-11 RX ORDER — IBUPROFEN 200 MG
1 TABLET ORAL DAILY
Status: DISCONTINUED | OUTPATIENT
Start: 2019-01-12 | End: 2019-01-11

## 2019-01-11 RX ORDER — SODIUM CHLORIDE 0.9 % (FLUSH) 0.9 %
5-40 SYRINGE (ML) INJECTION AS NEEDED
Status: DISCONTINUED | OUTPATIENT
Start: 2019-01-11 | End: 2019-01-12 | Stop reason: HOSPADM

## 2019-01-11 RX ORDER — ROCURONIUM BROMIDE 10 MG/ML
0.6 INJECTION, SOLUTION INTRAVENOUS
Status: COMPLETED | OUTPATIENT
Start: 2019-01-11 | End: 2019-01-11

## 2019-01-11 RX ORDER — MIDAZOLAM HYDROCHLORIDE 1 MG/ML
4 INJECTION, SOLUTION INTRAMUSCULAR; INTRAVENOUS
Status: COMPLETED | OUTPATIENT
Start: 2019-01-11 | End: 2019-01-11

## 2019-01-11 RX ORDER — SODIUM CHLORIDE 0.9 % (FLUSH) 0.9 %
5-40 SYRINGE (ML) INJECTION EVERY 8 HOURS
Status: DISCONTINUED | OUTPATIENT
Start: 2019-01-11 | End: 2019-01-12 | Stop reason: HOSPADM

## 2019-01-11 RX ADMIN — Medication 10 ML: at 22:00

## 2019-01-11 RX ADMIN — LORAZEPAM 1 MG: 2 INJECTION, SOLUTION INTRAMUSCULAR; INTRAVENOUS at 00:48

## 2019-01-11 RX ADMIN — SODIUM CHLORIDE 0.3 MCG/KG/HR: 900 INJECTION, SOLUTION INTRAVENOUS at 14:58

## 2019-01-11 RX ADMIN — MIDAZOLAM HYDROCHLORIDE 5 MG/HR: 5 INJECTION, SOLUTION INTRAMUSCULAR; INTRAVENOUS at 06:24

## 2019-01-11 RX ADMIN — KETAMINE HYDROCHLORIDE 120 MG: 50 INJECTION, SOLUTION INTRAMUSCULAR; INTRAVENOUS at 14:32

## 2019-01-11 RX ADMIN — LORAZEPAM 1 MG: 2 INJECTION, SOLUTION INTRAMUSCULAR; INTRAVENOUS at 01:14

## 2019-01-11 RX ADMIN — CALCIUM GLUCONATE 1 G: 98 INJECTION, SOLUTION INTRAVENOUS at 00:10

## 2019-01-11 RX ADMIN — MIDAZOLAM 5 MG: 1 INJECTION INTRAMUSCULAR; INTRAVENOUS at 02:20

## 2019-01-11 RX ADMIN — FOLIC ACID: 5 INJECTION, SOLUTION INTRAMUSCULAR; INTRAVENOUS; SUBCUTANEOUS at 08:02

## 2019-01-11 RX ADMIN — KETAMINE HYDROCHLORIDE 175 MG: 50 INJECTION INTRAMUSCULAR; INTRAVENOUS at 01:28

## 2019-01-11 RX ADMIN — Medication 10 ML: at 08:02

## 2019-01-11 RX ADMIN — LORAZEPAM 1 MG: 2 INJECTION INTRAMUSCULAR; INTRAVENOUS at 01:07

## 2019-01-11 RX ADMIN — SODIUM CHLORIDE 0.2 MCG/KG/HR: 900 INJECTION, SOLUTION INTRAVENOUS at 05:53

## 2019-01-11 RX ADMIN — LORAZEPAM 2 MG: 2 INJECTION, SOLUTION INTRAMUSCULAR; INTRAVENOUS at 03:48

## 2019-01-11 RX ADMIN — LORAZEPAM 0.5 MG: 2 INJECTION, SOLUTION INTRAMUSCULAR; INTRAVENOUS at 00:39

## 2019-01-11 RX ADMIN — DIPHENHYDRAMINE HYDROCHLORIDE 50 MG: 50 INJECTION, SOLUTION INTRAMUSCULAR; INTRAVENOUS at 00:52

## 2019-01-11 RX ADMIN — KETAMINE HYDROCHLORIDE 175 MG: 50 INJECTION INTRAMUSCULAR; INTRAVENOUS at 02:14

## 2019-01-11 RX ADMIN — MAGNESIUM SULFATE HEPTAHYDRATE 2 G: 40 INJECTION, SOLUTION INTRAVENOUS at 00:18

## 2019-01-11 RX ADMIN — HALOPERIDOL LACTATE 5 MG: 5 INJECTION, SOLUTION INTRAMUSCULAR at 01:03

## 2019-01-11 RX ADMIN — FAMOTIDINE 20 MG: 10 INJECTION, SOLUTION INTRAVENOUS at 20:17

## 2019-01-11 RX ADMIN — SODIUM CHLORIDE 0.5 MCG/KG/HR: 900 INJECTION, SOLUTION INTRAVENOUS at 22:56

## 2019-01-11 RX ADMIN — MIDAZOLAM HYDROCHLORIDE 10 MG/HR: 5 INJECTION, SOLUTION INTRAMUSCULAR; INTRAVENOUS at 13:34

## 2019-01-11 RX ADMIN — SODIUM CHLORIDE 75 ML/HR: 900 INJECTION, SOLUTION INTRAVENOUS at 08:03

## 2019-01-11 RX ADMIN — MIDAZOLAM HYDROCHLORIDE 8 MG/HR: 5 INJECTION, SOLUTION INTRAMUSCULAR; INTRAVENOUS at 10:25

## 2019-01-11 RX ADMIN — MIDAZOLAM HYDROCHLORIDE 6 MG/HR: 5 INJECTION, SOLUTION INTRAMUSCULAR; INTRAVENOUS at 10:23

## 2019-01-11 RX ADMIN — LORAZEPAM 4 MG: 2 INJECTION INTRAMUSCULAR; INTRAVENOUS at 19:58

## 2019-01-11 RX ADMIN — MIDAZOLAM HYDROCHLORIDE 4 MG: 1 INJECTION, SOLUTION INTRAMUSCULAR; INTRAVENOUS at 06:13

## 2019-01-11 RX ADMIN — MIDAZOLAM HYDROCHLORIDE 6 MG/HR: 5 INJECTION, SOLUTION INTRAMUSCULAR; INTRAVENOUS at 09:06

## 2019-01-11 RX ADMIN — SUCCINYLCHOLINE CHLORIDE 140 MG: 20 INJECTION, SOLUTION INTRAMUSCULAR; INTRAVENOUS at 05:45

## 2019-01-11 RX ADMIN — LIDOCAINE HYDROCHLORIDE 100 MG: 20 INJECTION, SOLUTION INTRAVENOUS at 05:44

## 2019-01-11 RX ADMIN — SODIUM CHLORIDE 0.2 MCG/KG/HR: 900 INJECTION, SOLUTION INTRAVENOUS at 14:50

## 2019-01-11 RX ADMIN — MAGNESIUM SULFATE HEPTAHYDRATE 2 G: 40 INJECTION, SOLUTION INTRAVENOUS at 08:44

## 2019-01-11 RX ADMIN — ROCURONIUM BROMIDE 50 MG: 10 INJECTION, SOLUTION INTRAVENOUS at 06:15

## 2019-01-11 RX ADMIN — KETAMINE HYDROCHLORIDE 60 MG: 50 INJECTION, SOLUTION INTRAMUSCULAR; INTRAVENOUS at 14:03

## 2019-01-11 RX ADMIN — LORAZEPAM 1 MG: 2 INJECTION, SOLUTION INTRAMUSCULAR; INTRAVENOUS at 00:45

## 2019-01-11 RX ADMIN — Medication 10 ML: at 16:01

## 2019-01-11 RX ADMIN — MIDAZOLAM HYDROCHLORIDE 4 MG/HR: 5 INJECTION, SOLUTION INTRAMUSCULAR; INTRAVENOUS at 09:48

## 2019-01-11 RX ADMIN — POTASSIUM CHLORIDE 10 MEQ: 7.46 INJECTION, SOLUTION INTRAVENOUS at 09:30

## 2019-01-11 NOTE — ED PROVIDER NOTES
EMERGENCY DEPARTMENT HISTORY AND PHYSICAL EXAM 
 
 
Date: 1/10/2019 Patient Name: Supriya Baker History of Presenting Illness Chief Complaint Patient presents with  Alcohol intoxication History Provided By: Patient HPI: Supriya Baker, 28 y.o. male with no significant PMHx  presents via EMS transport to the ED for evaluation of EtOH intoxication. Per EMS, pt's friend stated he had been \"drinking all day. \" They report pt only awakened to sternal rub and had 1 episode of emesis upon arrival to the ED. History is limited due to EtOH intoxication. Social Hx: + EtOH; + Smoker (1 ppd); + Illicit Drugs (marijuana) PCP: None Current Facility-Administered Medications Medication Dose Route Frequency Provider Last Rate Last Dose  potassium chloride 10 mEq in 100 ml IVPB  10 mEq IntraVENous NOW Alexys Gayle  mL/hr at 01/11/19 0326 10 mEq at 01/11/19 9854  diphenhydrAMINE (BENADRYL) injection 50 mg  50 mg IntraVENous NOW Alexys Gayle MD      
 ketamine (KETALAR) injection 175 mg  175 mg IntraMUSCular NOW Alexys Gayle MD      
 LORazepam (ATIVAN) injection 2 mg  2 mg IntraVENous NOW Alexys Gayle MD      
 potassium chloride 10 mEq in 100 ml IVPB  10 mEq IntraVENous NOW Alexys Gayle MD   Stopped at 01/11/19 2608 Current Outpatient Medications Medication Sig Dispense Refill  
 HYDROcodone-acetaminophen (NORCO) 5-325 mg per tablet Take 1 Tab by mouth every four (4) hours as needed for Pain. Max Daily Amount: 6 Tabs. 20 Tab 0  
 methocarbamol (ROBAXIN) 500 mg tablet Take 1 Tab by mouth four (4) times daily. 30 Tab 0 Past History Past Medical History: 
History reviewed. No pertinent past medical history. Past Surgical History: 
History reviewed. No pertinent surgical history. Family History: 
History reviewed. No pertinent family history. Social History: 
Social History Tobacco Use  Smoking status: Current Some Day Smoker Packs/day: 1.00  Smokeless tobacco: Never Used Substance Use Topics  Alcohol use: Yes Alcohol/week: 3.5 oz Types: 7 Cans of beer per week  Drug use: Yes Types: Marijuana Allergies: Allergies Allergen Reactions  Pcn [Penicillins] Anaphylaxis Review of Systems Review of Systems Unable to perform ROS: Other (EtOH) Physical Exam  
Physical Exam  
Constitutional: He is oriented to person, place, and time. He appears well-developed and well-nourished. Opens eyes to name and responds to sternal rub HENT:  
Head: Normocephalic and atraumatic.  
+ gag reflex Eyes: Conjunctivae and EOM are normal.  
Pupils 5 mm and reactive Neck: Neck supple. Cardiovascular: Normal rate, regular rhythm and intact distal pulses. Pulmonary/Chest: No accessory muscle usage. No respiratory distress. Abdominal: Soft. Normal appearance. There is no tenderness. Musculoskeletal: Normal range of motion. Neurological: He is alert and oriented to person, place, and time. Skin: Skin is warm and dry. Psychiatric: He has a normal mood and affect. His behavior is normal. Thought content normal.  
Nursing note and vitals reviewed. Diagnostic Study Results Labs - Recent Results (from the past 12 hour(s)) SAMPLES BEING HELD Collection Time: 01/10/19 10:11 PM  
Result Value Ref Range SAMPLES BEING HELD 1RED,1PST,1SST,1LAV   
 COMMENT Add-on orders for these samples will be processed based on acceptable specimen integrity and analyte stability, which may vary by analyte. ACETAMINOPHEN Collection Time: 01/10/19 10:11 PM  
Result Value Ref Range Acetaminophen level <2 (L) 10 - 30 ug/mL CBC WITH AUTOMATED DIFF Collection Time: 01/10/19 10:11 PM  
Result Value Ref Range WBC 9.7 4.1 - 11.1 K/uL  
 RBC 4.39 4. 10 - 5.70 M/uL  
 HGB 13.3 12.1 - 17.0 g/dL HCT 39.9 36.6 - 50.3 %  MCV 90.9 80.0 - 99.0 FL  
 MCH 30.3 26.0 - 34.0 PG  
 MCHC 33.3 30.0 - 36.5 g/dL  
 RDW 14.4 11.5 - 14.5 % PLATELET 360 973 - 505 K/uL MPV 8.5 (L) 8.9 - 12.9 FL  
 NRBC 0.0 0  WBC ABSOLUTE NRBC 0.00 0.00 - 0.01 K/uL NEUTROPHILS 51 32 - 75 % LYMPHOCYTES 38 12 - 49 % MONOCYTES 9 5 - 13 % EOSINOPHILS 1 0 - 7 % BASOPHILS 0 0 - 1 % IMMATURE GRANULOCYTES 1 (H) 0.0 - 0.5 % ABS. NEUTROPHILS 5.0 1.8 - 8.0 K/UL  
 ABS. LYMPHOCYTES 3.7 (H) 0.8 - 3.5 K/UL  
 ABS. MONOCYTES 0.9 0.0 - 1.0 K/UL  
 ABS. EOSINOPHILS 0.1 0.0 - 0.4 K/UL  
 ABS. BASOPHILS 0.0 0.0 - 0.1 K/UL  
 ABS. IMM. GRANS. 0.1 (H) 0.00 - 0.04 K/UL  
 DF AUTOMATED METABOLIC PANEL, COMPREHENSIVE Collection Time: 01/10/19 10:11 PM  
Result Value Ref Range Sodium 148 (H) 136 - 145 mmol/L Potassium 2.7 (LL) 3.5 - 5.1 mmol/L Chloride 114 (H) 97 - 108 mmol/L  
 CO2 19 (L) 21 - 32 mmol/L Anion gap 15 5 - 15 mmol/L Glucose 76 65 - 100 mg/dL BUN 8 6 - 20 MG/DL Creatinine 0.45 (L) 0.70 - 1.30 MG/DL  
 BUN/Creatinine ratio 18 12 - 20 GFR est AA >60 >60 ml/min/1.73m2 GFR est non-AA >60 >60 ml/min/1.73m2 Calcium 6.2 (LL) 8.5 - 10.1 MG/DL Bilirubin, total 0.1 (L) 0.2 - 1.0 MG/DL  
 ALT (SGPT) 20 12 - 78 U/L  
 AST (SGOT) 17 15 - 37 U/L Alk. phosphatase 63 45 - 117 U/L Protein, total 5.2 (L) 6.4 - 8.2 g/dL Albumin 2.6 (L) 3.5 - 5.0 g/dL Globulin 2.6 2.0 - 4.0 g/dL A-G Ratio 1.0 (L) 1.1 - 2.2 ETHYL ALCOHOL Collection Time: 01/10/19 10:11 PM  
Result Value Ref Range ALCOHOL(ETHYL),SERUM 371 (H) <10 MG/DL  
LIPASE Collection Time: 01/10/19 10:11 PM  
Result Value Ref Range Lipase 303 73 - 186 U/L  
SALICYLATE Collection Time: 01/10/19 10:11 PM  
Result Value Ref Range Salicylate level 3.5 2.8 - 20.0 MG/DL MAGNESIUM Collection Time: 01/10/19 10:11 PM  
Result Value Ref Range Magnesium 1.5 (L) 1.6 - 2.4 mg/dL EKG, 12 LEAD, INITIAL Collection Time: 01/10/19 11:48 PM  
Result Value Ref Range Ventricular Rate 76 BPM  
 Atrial Rate 76 BPM  
 P-R Interval 172 ms QRS Duration 98 ms Q-T Interval 418 ms QTC Calculation (Bezet) 470 ms Calculated P Axis 89 degrees Calculated R Axis 100 degrees Calculated T Axis 50 degrees Diagnosis Normal sinus rhythm Rightward axis Borderline ECG When compared with ECG of 17-OCT-2017 11:08, No significant change was found DRUG SCREEN, URINE Collection Time: 01/11/19 12:58 AM  
Result Value Ref Range AMPHETAMINES NEGATIVE  NEG    
 BARBITURATES NEGATIVE  NEG BENZODIAZEPINES NEGATIVE  NEG    
 COCAINE NEGATIVE  NEG METHADONE NEGATIVE  NEG    
 OPIATES NEGATIVE  NEG    
 PCP(PHENCYCLIDINE) NEGATIVE  NEG    
 THC (TH-CANNABINOL) POSITIVE (A) NEG Drug screen comment (NOTE) GLUCOSE, POC Collection Time: 01/11/19  3:06 AM  
Result Value Ref Range Glucose (POC) 111 (H) 65 - 100 mg/dL Performed by Astrid Sanders Radiologic Studies -  
CT HEAD WO CONT Final Result IMPRESSION:  
  
No acute process. XR CHEST PORT    (Results Pending) CT Results  (Last 48 hours) 01/11/19 0238  CT HEAD WO CONT Final result Impression:  IMPRESSION:  
   
No acute process. Narrative:  INDICATION:   Decreased alertness EXAM:  HEAD CT WITHOUT CONTRAST  
   
COMPARISON:  None TECHNIQUE:  Routine noncontrast axial head CT was performed. Sagittal and  
coronal reconstructions were generated. CT dose reduction was achieved through use of a standardized protocol tailored  
for this examination and automatic exposure control for dose modulation. FINDINGS:  
   
Ventricles:  Midline, no hydrocephalus. Intracranial Hemorrhage:  None. Brain Parenchyma/Brainstem:  Normal for age. Basal Cisterns:  Normal.   
Paranasal Sinuses:  Left maxillary sinus mucus retention cyst.   
Additional Comments:  Partial empty sella turcica. CXR Results  (Last 48 hours) None Medical Decision Making I am the first provider for this patient. I reviewed the vital signs, available nursing notes, past medical history, past surgical history, family history and social history. Vital Signs-Reviewed the patient's vital signs. Patient Vitals for the past 12 hrs: 
 Temp Pulse Resp BP SpO2  
01/11/19 0230    (!) 157/118   
01/11/19 0200  98 16 (!) 153/101 100 % 01/11/19 0100  (!) 102 23 128/89   
01/10/19 2230  85 21 123/81 100 % 01/10/19 2220 97.6 °F (36.4 °C)      
01/10/19 2212  73 16 116/84 98 % 01/10/19 2200     99 % Pulse Oximetry Analysis - 98% on RA Cardiac Monitor:  
Rate: 73 bpm 
Rhythm: Normal Sinus Rhythm EKG interpretation: (Preliminary)2882 Rhythm: normal sinus rhythm; and regular . Rate (approx.): 76; Axis: rightward axis; OK interval: normal; QRS interval: normal ; ST/T wave: normal; Other findings: U wave noted V4-V6. Written by Anita Nicholas, ED Scribe, as dictated by Bettejane Schaumann. Mavis Herman MD 
 
Records Reviewed: Nursing Notes, Old Medical Records, Ambulance Run Sheet, Previous Radiology Studies and Previous Laboratory Studies Provider Notes (Medical Decision Making): DDx: alcohol intoxication, polysubstance abuse, possible mdma or bath salts, dehydration, electrolyte abnormality, ICH/occult trauma, aspiration, encephalopathy ED Course:  
Initial assessment performed. The patients presenting problems have been discussed, and they are in agreement with the care plan formulated and outlined with them. I have encouraged them to ask questions as they arise throughout their visit. ED Course as of Jan 11 0315 Fri Jan 11, 2019  
0035 Increased agitation. Pt placed in restraints. [MA] 0130 Around 12:30am patient became increasingly agitated and received 0.5 additional mg of ativan iv, for a total of 1mg of ativan iv.  Agitation persisted so 50mg Benadryl iv was ordered. Pt lost his iv before he could receive benadryl or any further sedation. He was temporarily placed in physical restraints until we could achieve appropriate chemical sedation. He was fighting and requiring multiple staff to restrain him while he received im meds. He got 50mg benadryl im, then 1mg ativan im, then 5mg haldol im, then 2mg ativan im. He still was agitated and fighting so he was given im ketamine 2mg/kg. At this point he seems to be calming down and we are able to pursue iv access and head ct.  [SS] 0154 CONSULT NOTE:  
1:54 AM 
Lacey Salas. Sarah Lam MD, spoke with Ashlee Carr MD, Specialty: Hospitalist 
Discussed pt's hx, disposition, and available diagnostic and imaging results. Reviewed care plans. Notes that pt would be better served at a facility with a higher level of care. Plan to transfer. Written by Watson Ruano. Thai Dewitt, ED Scribe, as dictated by Lacey Salas. Sarah Lam MD 
  [MA] 0210 As radiology comes to take him to head ct, he is beginning to arouse and show signs of agitation again, so we are giving him an additional 2mg/kg ketamine im. [SS] 3172 Patient got versed 5mg iv as well to facilitate head ct. He is back from ct after successfully completing the study, and we are arranging transport.   [SS]  
0250 Dr. Laci Kendall at [facility] ED accepts the patient for transfer to their ED (higher level of care should icu be necessary as sedatives wear off). Total amount of sedatives given to this point: 
50mg benadryl im, 
4mg ativan im, 
5mg haldol im, 
4mg/kg ketamine im, 
5mg versed iv.  [SS] 0312 Pulse and BP increasing. Additional benzos ordered. [SS] ED Course User Index [MA] Oh Torrez 
[SS] Ranjit Boles MD  
 
 
Critical Care Time: CRITICAL CARE NOTE : 
12:35 AM 
 
IMPENDING DETERIORATION -Airway, Cardiovascular, CNS and Metabolic ASSOCIATED RISK FACTORS - Dysrhythmia, Metabolic changes and CNS Decompensation MANAGEMENT- Bedside Assessment, Supervision of Care and Transfer INTERPRETATION -  Xrays, CT Scan, ECG, Blood Pressure and Cardiac Output Measures INTERVENTIONS - hemodynamic mngmt, vascular control, Neurologic interventions  and Metobolic interventions CASE REVIEW - Hospitalist and Nursing TREATMENT RESPONSE -Improved PERFORMED BY - Self NOTES   : 
I have spent 120 minutes of critical care time involved in lab review, consultations with specialist, family decision- making, bedside attention and documentation. During this entire length of time I was immediately available to the patient . Mary Harley MD 
 
 
Disposition: 
Transfer to [facility] 4:30am - Pt leaving with [place] ambulance. Appropriately sedated, vss. PLAN: 
1. Current Discharge Medication List  
  
 
2. Follow-up Information None Return to ED if worse Diagnosis Clinical Impression: 1. Alcohol intoxication with delirium ([facility] [address] 2. Hypocalcemia 3. Hypomagnesemia 4. Hypokalemia 5. Hypernatremia 6. Polysubstance abuse ([address] 7. Delirium This note is prepared by Bj Bailey. Noble Donnelly, acting as Scribe for Jacob Marcano. Lata Harley MD, Mary Harley MD: The scribe's documentation has been prepared under my direction and personally reviewed by me in its entirety. I confirm that the note above accurately reflects all work, treatment, procedures, and medical decision making performed by me. This note will not be viewable in [address]
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
## 2019-01-11 NOTE — ROUTINE PROCESS
TRANSFER - OUT REPORT: 
 
Verbal report given to 711 Anibal Lindsey RN (name) on Arlet Cruz  being transferred to ICU 17(unit) for routine progression of care Report consisted of patients Situation, Background, Assessment and  
Recommendations(SBAR). Information from the following report(s) SBAR and MAR was reviewed with the receiving nurse. Lines:    
 
Opportunity for questions and clarification was provided. Patient transported with: 
 [employer]

## 2019-01-11 NOTE — ED NOTES
Unable to obtain weight as ER bed does not weigh. Last weight was 88.5kg in October. Pt is too aggressive and combative to get an accurate weight. Called pharmacy, will verify Ketamine for Dr. Roscoe Marley
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
## 2019-01-11 NOTE — ED NOTES
Spoke with Everardo Hopkins, pharmacist at [facility] about 2nd Ketamine dose. Per Everardo Hopkins, do not pull 2nd vial of Ketamine (leaving ER with 0 Ketamine)  I wasted Ketamine in the pyxis with a 2nd RN, Vanna Vital.  2nd dose of Ketamine was given with same vial.

## 2019-01-11 NOTE — PROGRESS NOTES
2:02 PM 
Pt bucking the vent/ grabbing his tube; will give ketamine to properly sedate/ Pt safety Hospitalist has been paged;

## 2019-01-11 NOTE — H&P
[address] HISTORY AND PHYSICAL Olu Vasquez 
MR#: [id number] : 1985 ACCOUNT #: [de-identified] ADMIT DATE: 2019 ATTENDING PHYSICIAN:  Martha Bosworth, MD 
 
CHIEF COMPLAINT:  \"Feel like alcohol intoxication. \" HISTORY OF PRESENT ILLNESS:  This is a 42-year-old male. Please note that I was not able to get any meaningful history from the patient as the patient is altered mental state and is already intubated and sedated by the ER physician. The whole history is as per the ER physician and nurses as well as the documentation in the chart. Since that it seems that the patient is an alcoholic and lately he has been drinking alcohol more than usual, yesterday the patient fell on the ground and was taken to [facility] where he was being managed but overnight the patient got increasingly agitated and aggressive. That is when the patient was given Versed, ketamine and Haldol, but despite significant doses the patient continued to get worse, that is why the patient was [facility] for [facility]ation of care. In the ER, the patient was evaluated and was sedated and intubated and was given to the hospitalist team for further management. Also, the patient was found to have some electrolyte abnormalities which included low magnesium and high sodium. As far as we know, the patient did not have any nausea, vomiting, cough, fever, headache, dizziness. No changes in bowel movements. REVIEW OF SYSTEMS:  Quite limited as the patient is not able to give any meaningful history. PAST MEDICAL HISTORY:  Again, not able to be obtained. The patient is a heavily is already sedated. PAST SURGICAL HISTORY:  Again, quite limited.  
 
SOCIAL HISTORY:  It seems like the patient is a current smoker and smokes about 1 pack per day, drinks alcohol on a regular basis and lately has been drinking more than usual.   
 
 ALLERGIES:  IF LISTED, THE PATIENT IS ALLERGIC TO PENICILLIN WHEN THE PATIENT IS MORE AWAKE, WE NEED TO FIND OUT WHAT EXACTLY IS ALLERGIC AND THE PATIENT IS TRULY ALLERGIC OR NOT. FAMILY HISTORY:  Currently not able to be obtained. PHYSICAL EXAMINATION: 
VITAL SIGNS:  At the present time the patient was seen, the patient's vitals were as follows:  Blood pressure 104/63, pulse 88, respiratory 15, saturating 100% on ventilator. HEENT:  Normocephalic, atraumatic. EYES:  PERRLA, EOMI. EARS:  Bilateral, no growth. NOSE:  No polyp, no bleeding. MOUTH:  ET tube in place. No signs of tongue bite or vomiting. NECK:  Supple. No JVD. RESPIRATORY:  Clear to auscultation bilaterally. No adventitious breath sounds. CARDIOVASCULAR:  S1, S2 normal.  No murmurs, rubs or gallops. GASTROINTESTINAL:  Bowel sounds present, soft, nontender, nondistended. NEUROLOGIC:  Cranial nerves II-XII were not able to ascertain but since that, neurologically the patient is quite at his baseline. PSYCHIATRIC:  Was not able to do a good a psychiatric examination as the patient is already sedated and intubated. LABORATORY AND RADIOLOGICAL RESULTS:  WBC 9.7, hemoglobin 10.3, hematocrit 39 and a platelet count of 830. Sodium 148, potassium 2.7, chloride 114, bicarbonate 19, glucose 76, creatinine was 0.45, calcium 6.2, magnesium 1.5. Drug screen that was done yesterday showed positive benzodiazepine and positive cannabis. The patient had a CT scan of the head done yesterday, which did not show any acute process. X-ray of the chest shows successful intubation and NG tube placement. EKG shows normal sinus rhythm, no ST-T changes such as ischemia.  
 
ASSESSMENT AND PLAN:  This is a 78-year-old male with a past medical history of alcohol abuse and smoker who comes over here after being transferred from [facility] with altered mental state and alcohol intoxication/withdrawal.  Currently, the patient is sedated and intubated. 1.  Alcohol intoxication/withdrawal:  When the patient had presented at [facility].  His alcohol level was 371. For now, the patient is on Precedex as well as versed. He will continue that and titrate as needed. The patient would have to be transferred to ICU for appropriate management. We will also put the patient on banana bag/CIWA protocol. Once the patient is more stable, we will  the patient for outpatient care. 2.  Hypernatremia. I think the patient is quite dehydrated. We will continue to give the patient IV fluids. We will repeat a CMP now and will readjust the fluids as needed. 3.  Hypocalcemia/hypomagnesemia. We will replace as needed. 4.  Smoking. We will put the patient on nicotine patch. I spent about 50 minutes in taking care of the patient. Milagros Patel MD PG/JORGE 
D: 01/11/2019 08:21 T: 01/11/2019 08:48 JOB #: [id number]

## 2019-01-11 NOTE — ED NOTES
Spoke with patients mother on the phone. We was able to offer some insight into patients drinking habits and reports old extacy used, but does not believe her son takes any other recreation drugs at this time. 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 Mother is local and will be in to see son sometime today.
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
## 2019-01-11 NOTE — PROGRESS NOTES
**Consult Information** 
Member Facility: [address] [place] [address] Joycelyn MRN: [id number] Consult ID: [id number] Facility Time Zone: ET 
Date and Time of Consult: 01/11/2019 01:45:52 AM 
Requesting Clinician: Dr. Jarret Bansal Time of Call : 01/11/2019 01:51:00 AM 
Patient Name: Harpreet Martin Date of Birth: [id number] Gender: Male **Hospitalist Clinical Note** Clinical Note: Pt has acute AMS with extreme agitation requiring multiple sedating medications ( ketamine, ativan, versed, bendaryl) . He has multile electrolytes abnormalities, high level of alcohol, UDS positive for THC, pending head CT, may need to be maintained on continuous medication like precedex and will benefit being at a facility with ICU level of care and support.
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
## 2019-01-11 NOTE — ED TRIAGE NOTES
Triage note: pt arrives from [facility] with a c/o AMS. Per EMS family states pt drank  A large amount of alcohol in a short amount of time , stood up , fell on face and loss consciousness  . Pt has GCS  Upon arrival of 6 . VSS

## 2019-01-11 NOTE — ED NOTES
Pt arrived to ED via ems with c/o etoh and responsive to painful stimuli. Ems reports that pt consumed a large amount of alcoholic beverages over a short period of time. Pt brother reported to ems that he just fell over. Nursing assessment limited due to patient condition. Will continue to monitor. See nursing assessment. Safety precautions in place; call light within reach. Emergency Department Nursing Plan of Care The Nursing Plan of Care is developed from the Nursing assessment and Emergency Department Attending provider initial evaluation. The plan of care may be reviewed in the ED Provider note. The Plan of Care was developed with the following considerations:  
Patient / Family readiness to learn indicated by:pat responsive only to painful stimuli Persons(s) to be included in education: none Barriers to Learning/Limitations:pt highly intoxicated Signed Lamar Epstein RN   
1/11/2019   1:37 AM

## 2019-01-11 NOTE — ED NOTES
Pt noted be trying to get out of bed, pulling at lines, etc. Dr Supa Oneill made aware. New order rec'd for 0.5mg Ativan. HENRY Ty made aware.

## 2019-01-11 NOTE — ED NOTES
Patient has been instructed that they have been given ketamine 175mg x2, benadryl 50 mg, haldol 5 mg, versed 5mg, Ativan 4mg which contains opioids, benzodiazepines, or other sedating drugs. Patient is aware that they  will need to refrain from driving or operating heavy machinery after taking this medication. Patient also instructed that they need to avoid drinking alcohol and using other products containing opioids, benzodiazepines, or other sedating drugs. Patient verbalized understanding.

## 2019-01-11 NOTE — ED NOTES
TRANSFER - OUT REPORT: 
 
Verbal report given to dominic judge rn(name) on Alexander Walker  being transferred to [facility] ed(unit) for routine progression of care Report consisted of patients Situation, Background, Assessment and  
Recommendations(SBAR). Information from the following report(s) SBAR, Kardex, [facility] and Recent Results was reviewed with the receiving nurse. Lines:  
Peripheral IV 01/11/19 Right Hand (Active) Site Assessment Clean, dry, & intact 1/11/2019  1:58 AM  
Phlebitis Assessment 0 1/11/2019  1:58 AM  
Infiltration Assessment 0 1/11/2019  1:58 AM  
Dressing Status Clean, dry, & intact 1/11/2019  1:58 AM  
Dressing Type Transparent;Tape 1/11/2019  1:58 AM  
Hub Color/Line Status Blue;Flushed 1/11/2019  1:58 AM  
   
Peripheral IV 01/11/19 Left Arm (Active) Site Assessment Clean, dry, & intact 1/11/2019  2:21 AM  
Phlebitis Assessment 0 1/11/2019  2:21 AM  
Infiltration Assessment 0 1/11/2019  2:21 AM  
Dressing Status Clean, dry, & intact 1/11/2019  2:21 AM  
Dressing Type Transparent 1/11/2019  2:21 AM  
Hub Color/Line Status Pink;Flushed 1/11/2019  2:21 AM  
  
 
Opportunity for questions and clarification was provided. Patient transported with: 
 O2 @ 4 liters, ekg monitor, potassium drip, patient belongings

## 2019-01-11 NOTE — ED NOTES
Bedside shift change report given to bo FIGUEROA (oncoming nurse) by Lor Meehan  (offgoing nurse). Report included the following information SBAR, ED Summary, Intake/Output and Recent Results.

## 2019-01-11 NOTE — ED PROVIDER NOTES
The patient is a 68-year-old male with a past medical history significant for alcohol abuse, who was sent to the ED by EMS as a transfer from [facility] for acute alcohol intoxication with delirium. The patient became combative and very aggressive with the staff requiring 4 point restraints as well as chemical sedation with Versed, ketamine, and Haldol. The patient was also given another bolus of 10 mg of Versed by EMS while in route to the hospital. The patient was also found to have a low calcium, potassium, and magnesium and, high sodium. The patient was heavily sedated, but continues to be combative with nonpurposeful movement. He is unable to answer my questions at this time. Most of the history was obtained from the referring hospital, nursing staff and EMS. No past medical history on file. No past surgical history on file. No family history on file. Social History Socioeconomic History  Marital status: SINGLE Spouse name: Not on file  Number of children: Not on file  Years of education: Not on file  Highest education level: Not on file Social Needs  Financial resource strain: Not on file  Food insecurity - worry: Not on file  Food insecurity - inability: Not on file  Transportation needs - medical: Not on file  Transportation needs - non-medical: Not on file Occupational History  Not on file Tobacco Use  Smoking status: Current Some Day Smoker Packs/day: 1.00  Smokeless tobacco: Never Used Substance and Sexual Activity  Alcohol use: Yes Alcohol/week: 3.5 oz Types: 7 Cans of beer per week  Drug use: Yes Types: Marijuana  Sexual activity: Yes  
  Partners: Female Birth control/protection: Condom Other Topics Concern  Not on file Social History Narrative  Not on file ALLERGIES: Pcn [penicillins] Review of Systems Unable to perform ROS: Acuity of condition Vitals:  
 01/11/19 0600 01/11/19 0615 01/11/19 0630 01/11/19 0645 BP: (!) 162/114 (!) 147/109 141/83 121/73 Pulse: (!) 116 (!) 115 (!) 110 100 Resp: 16 17 15 15 SpO2: 100% 99% 99% 100% Weight:      
      
 
Physical Exam  
Nursing note and vitals reviewed. CONSTITUTIONAL: Well-appearing; well-nourished; in moderate distress; trashing about HEAD: Normocephalic; atraumatic EYES: PERRL; EOM intact; conjunctiva and sclera are clear bilaterally. ENT: No rhinorrhea; normal pharynx with no tonsillar hypertrophy; mucous membranes pink/moist, no erythema, no exudate. NECK: Supple; non-tender; no cervical lymphadenopathy CARD: Normal S1, S2; no murmurs, rubs, or gallops. Regular rate and rhythm. RESP: Normal respiratory effort; breath sounds clear and equal bilaterally; no wheezes, rhonchi, or rales. ABD: Normal bowel sounds; non-distended; non-tender; no palpable organomegaly, no masses, no bruits. Back Exam: Normal inspection; no vertebral point tenderness, no CVA tenderness. EXT: Non-tender to palpation; no swelling or deformity; distal pulses are normal, no edema. SKIN: Warm; dry; no rash. NEURO: Lethargic, GCS=5;  incoherent, sensory and motor are non-focal.  
 
MDM Number of Diagnoses or Management Options Acute alcohol intoxication with alcoholism with delirium ([facility] [address]): Altered mental status, unspecified altered mental status type: Hypokalemia:  
Hypomagnesemia:  
Polysubstance abuse [facility]):  
Diagnosis management comments: Assessment: altered mental status, secondary to alcohol intoxication with delirium/psychosis; the patient is unable to maintain his airway as he was having sonorous respiration with decreasing oxygen saturation./ electrolyte abnormalities. The patient  Will need airway management/ monitor for alcohol withdrawal symptoms/   Replenish electrolytes Plan: airway management with rapid sequence intubation/ ventilation management/ sedation/ iV fluid/ banana bag/ consult hospitalist and pulmonary intensivist Monitor and Reevaluate. Amount and/or Complexity of Data Reviewed Clinical lab tests: ordered and reviewed Tests in the radiology section of CPT®: ordered and reviewed Tests in the medicine section of CPT®: reviewed and ordered Discussion of test results with the performing providers: yes Decide to obtain previous medical records or to obtain history from someone other than the patient: yes Obtain history from someone other than the patient: yes Review and summarize past medical records: yes Discuss the patient with other providers: yes Risk of Complications, Morbidity, and/or Mortality Presenting problems: high Diagnostic procedures: high Management options: high Critical Care Total time providing critical care: (Total critical care time spent exclusive of procedures: 60 minutes) Patient Progress Patient progress: other (comment) (critical) Behavioral Restraint Face-to-Face Evaluation 
(must be completed within one hour of initiation of restraints) Evaluate immediate situation:  Agitated and combative Reaction to intervention: Seated and combative Medical Condition/Assessment: critical 
 
Behavioral Condition/Assessment: intoxicated and combative The patients review of systems, history, medications, and recent labs were reviewed at this time. Continue/Discontinue restraints at this time: Continue Lurdes Mota MD 05:15 AM. Intubation Date/Time: 1/11/2019 7:04 AM 
Performed by: Dada Goodson MD 
Authorized by: Dada Goodson MD  
 
Consent:  
  Consent obtained:  Emergent situation Alternatives discussed:  No treatment Pre-procedure details:  
  Patient status:  Altered mental status Mallampati score:  II 
  Pretreatment medications:  Lidocaine Paralytics:  Succinylcholine Procedure details:  
  Preoxygenation:  Bag valve mask   CPR in progress: no   
 Intubation method:  Oral 
  Laryngoscope blade: Mac 4 Tube size (mm):  7.5 Tube type:  Cuffed Number of attempts:  2 Ventilation between attempts: yes Cricoid pressure: yes Tube visualized through cords: yes Placement assessment: ETT to lip:  21 ETT to teeth:  20 Tube secured with:  ETT tyler Breath sounds:  Equal and absent over the epigastrium Placement verification: chest rise, condensation, CXR verification, direct visualization, equal breath sounds, ETCO2 detector and tube exhalation CXR findings:  ETT in proper place Post-procedure details:  
  Patient tolerance of procedure: Tolerated well, no immediate complications ED EKG interpretation: 
Rhythm: normal sinus rhythm; and regular . Rate (approx.): 96; Axis: normal; P wave: normal; QRS interval: normal ; ST/T wave: non-specific changes; in  Lead: Diffusely; Other findings: abnormal ekg. This EKG was interpreted by Delfino Tena MD,ED Provider. XRAY INTERPRETATION (ED MD) Chest Xray ET tube and NG tube is satisfactory position; No acute process seen. Normal heart size. No bony abnormalities. No infiltrate. Ash Ochoa MD 7:02 AM 
 
PROGRESS NOTE: 
Pt has been reexamined by Ash Ochoa MD all available results have been reviewed with pt and any available family. Family understands sx, dx, and tx in ED. Care plan has been outlined and questions have been answered. Pt and any available family understands and agrees to need for admission to hospital for further tx not available in ED. Pt is ready for admission. Written by Delfino Tena MD,  7:02 AM 
 
CONSULT NOTE: 
Ash Ochao MD spoke with Dr. Janeen Small of the adult hospitalist team. Discussed patient's presentation, history, physical assessment, and available diagnostic results. He will evaluate, write orders and admit the patient to the hospital. 7:02 AM 
 
.

## 2019-01-11 NOTE — ED NOTES
Received report from night shift nurses, assumed care of patient at this time. RN's at bedside performing assessment. Patient is sedated, vital signs are stable as documented, versed, precedex, IV Foulis and vitamin bag are infusing. maintaining oxygen saturations and tolerating vent well. 4 point restraints remain on patient.

## 2019-01-11 NOTE — PROGRESS NOTES
2:51 PM 
Pt bucking the vent again; restarted precedex; Notified Dr Seda Rivas via tiger text; also given 120 mg ketamine also Total critical care time spent exclusive of procedures:  45 min

## 2019-01-11 NOTE — CONSULTS
Initial Pulmonary / Critical Care Consultation    Assessment / Plan:    1. Acute respiratory failure secondary to EtOH abuse / intoxication and additional sedation for aggitation --> intubated in ER for airway protection - now sedated on precedex and versed drips  2. EtOH abuse  3. Tobacco abuse  4. Hypokalemia / hypomagnesemia    --Vent support / vent bundle  --ABCDE weaning protocol as mental status allows  --Vitamin replacement  --replaced electrolytes as needed  --CIWA protocol for potential EtOH withdrawal / Sz    History / Subjective:  Reason:  Respiratory failure  Requesting Provider:  Dr Rebeca Pulido is a 28 y.o.  male who  has no past medical history on file. admitted 1/11/2019 with EtOH intoxication and agitation requiring sedation to the point that he had to be intubated. The patient is intubated and sedated and is unable to provide any history. The history was obtained from review of the medical record. CXR - ETT, no acute process  tox screen pos of THC and benzos  EtOH level initially 371    He is now on precedex and versed drips  Opens eyes to stimulation    He is receiving IV vitamins    Allergies   Allergen Reactions    Pcn [Penicillins] Anaphylaxis     No past medical history on file. No past surgical history on file. Prior to Admission medications    Not on File      No family history on file. Social History     Tobacco Use    Smoking status: Current Some Day Smoker     Packs/day: 1.00    Smokeless tobacco: Never Used   Substance Use Topics    Alcohol use: Yes     Alcohol/week: 3.5 oz     Types: 7 Cans of beer per week      ROS:  Review of systems not obtained due to patient factors.     Objective:  Patient Vitals for the past 4 hrs:   BP Temp Pulse Resp SpO2   01/11/19 1335 125/69  78 15 98 %   01/11/19 1330 121/72  80 15 98 %   01/11/19 1325 121/75  81 15 98 %   01/11/19 1315 126/78  85 16 98 %   01/11/19 1300 126/66  87 23 99 %   01/11/19 1245 134/85  81 17 100 %   19 1212   77 15 100 %   19 1135 108/63  76 15 100 %   19 1130 103/62  75 15 100 %   19 1125 103/71  77 15 100 %   19 1120 101/64  77 15 100 %   19 1115 116/64  77 15 100 %   19 1100 109/69 97.2 °F (36.2 °C) 79 15 100 %   19 1055 114/75  79 15 100 %   19 1050 112/75  80 15 100 %     Temp (24hrs), Av.3 °F (36.3 °C), Min:97.2 °F (36.2 °C), Max:97.6 °F (36.4 °C)    CVP:          Intake/Output Summary (Last 24 hours) at 2019 1447  Last data filed at 2019 1103  Gross per 24 hour   Intake 395.23 ml   Output 100 ml   Net 295.23 ml     Blood Sugar:  Glucose   Date Value Ref Range Status   2019 96 65 - 100 mg/dL Final   01/10/2019 76 65 - 100 mg/dL Final   10/17/2017 109 (H) 65 - 100 mg/dL Final   2016 85 65 - 100 mg/dL Final   2012 92 65 - 100 MG/DL Final     Exam:  Sedate RASS -2  Oral ett  Anicteric  MMM  No accessory use  Trachea midline  Symmetrical chest expansion  Rhonchi  RRR  Soft, bs present  Warm and dry  No edema    Lab data was reviewed. Radiology images were independently viewed and available reports were reviewed. CXR - ETT, low lung volumes, no acute process    Head CT - no acute process    EKG - NSR, QTc 457 ms    Lab:  Recent Labs     19  0704 19  0632 01/10/19  2211   WBC  --  7.8 9.7   HGB  --  13.5 13.3   PLT  --  214 281   NA  --  143 148*   K  --  4.0 2.7*   CL  --  110* 114*   CO2  --  25 19*   BUN  --  7 8   CREA  --  0.61* 0.45*   GLU  --  96 76   CA  --  7.4* 6.2*   MG 2.2  --  1.5*   TBILI  --  0.2 0.1*   SGOT  --  35 17     ABG:  Recent Labs     19  0653   PHI 7.304*   PCO2I 44.5   PO2I 152*   HCO3I 22.1   SO2I 99*   FIO2I 40     Results for Josiah Freitas (MRN [id number]) as of 2019 14:47   Ref.  Range 9/3/2012 22:46 1/10/2019 22:11 2019 00:58 2019 06:32 2019 07:04   ALCOHOL(ETHYL),SERUM Latest Ref Range: <10 MG/DL  371 (H)   162 (H)   Results for Karla Xiong (MRN [id number]) as of 1/11/2019 14:47   Ref. Range 1/11/2019 06:32   AMPHETAMINES Latest Ref Range: NEG   NEGATIVE   BARBITURATES Latest Ref Range: NEG   NEGATIVE   BENZODIAZEPINES Latest Ref Range: NEG   POSITIVE (A)   COCAINE Latest Ref Range: NEG   NEGATIVE   METHADONE Latest Ref Range: NEG   NEGATIVE   OPIATES Latest Ref Range: NEG   NEGATIVE   PCP(PHENCYCLIDINE) Latest Ref Range: NEG   NEGATIVE   THC (TH-CANNABINOL) Latest Ref Range: NEG   POSITIVE (A)   DRUG SCREEN, URINE Unknown Rpt (A)     Results for Karla Xiong (MRN [id number]) as of 1/11/2019 14:47   Ref.  Range 1/10/2019 22:11   Acetaminophen level Latest Ref Range: 10 - 30 ug/mL <2 (L)   Salicylate level Latest Ref Range: 2.8 - 20.0 MG/DL 3.5     Jorge Hwang MD

## 2019-01-11 NOTE — PROGRESS NOTES
Admission Medication Reconciliation: 
 
Information obtained from: This medication history was obtained from the patient's electronic records. He is intubated without family present in the room. Rx Query is present but shows not prescriptions for chronic medications for 6+ months. [facility] Summary:  
 
Medications added: none Medications deleted: hydrocodone-APAP (filled #6 in October 2018), methocarbamol (filled #30 in July 2018) Inpatient orders were reviewed and no changes are needed. Chief Complaint for this Admission:  alcohol intoxication Significant PMH/Disease States: No past medical history on file. Allergies:  Pcn [penicillins] Prior to Admission Medications:  
None Thank you for allowing me to participate in the care of this patient. Please contact the pharmacy () or the medication reconciliation pharmacist () with any questions. Brock Myrick, Pharm. D., BCPS, BCPPS

## 2019-01-12 ENCOUNTER — APPOINTMENT (OUTPATIENT)
Dept: GENERAL RADIOLOGY | Age: 34
DRG: 896 | End: 2019-01-12
Attending: INTERNAL MEDICINE
Payer: COMMERCIAL

## 2019-01-12 VITALS
OXYGEN SATURATION: 95 % | BODY MASS INDEX: 29.14 KG/M2 | HEART RATE: 57 BPM | SYSTOLIC BLOOD PRESSURE: 130 MMHG | WEIGHT: 169.75 LBS | DIASTOLIC BLOOD PRESSURE: 85 MMHG | RESPIRATION RATE: 17 BRPM | TEMPERATURE: 97.9 F

## 2019-01-12 LAB
ALBUMIN SERPL-MCNC: 3 G/DL (ref 3.5–5)
ALBUMIN/GLOB SERPL: 1 {RATIO} (ref 1.1–2.2)
ALP SERPL-CCNC: 81 U/L (ref 45–117)
ALT SERPL-CCNC: 27 U/L (ref 12–78)
ANION GAP SERPL CALC-SCNC: 7 MMOL/L (ref 5–15)
AST SERPL-CCNC: 31 U/L (ref 15–37)
BASOPHILS # BLD: 0 K/UL (ref 0–0.1)
BASOPHILS NFR BLD: 1 % (ref 0–1)
BILIRUB SERPL-MCNC: 0.5 MG/DL (ref 0.2–1)
BUN SERPL-MCNC: 9 MG/DL (ref 6–20)
BUN/CREAT SERPL: 13 (ref 12–20)
CALCIUM SERPL-MCNC: 7.5 MG/DL (ref 8.5–10.1)
CHLORIDE SERPL-SCNC: 113 MMOL/L (ref 97–108)
CO2 SERPL-SCNC: 22 MMOL/L (ref 21–32)
CREAT SERPL-MCNC: 0.69 MG/DL (ref 0.7–1.3)
DIFFERENTIAL METHOD BLD: ABNORMAL
EOSINOPHIL # BLD: 0.1 K/UL (ref 0–0.4)
EOSINOPHIL NFR BLD: 1 % (ref 0–7)
ERYTHROCYTE [DISTWIDTH] IN BLOOD BY AUTOMATED COUNT: 14.5 % (ref 11.5–14.5)
GLOBULIN SER CALC-MCNC: 3 G/DL (ref 2–4)
GLUCOSE SERPL-MCNC: 80 MG/DL (ref 65–100)
HCT VFR BLD AUTO: 37.8 % (ref 36.6–50.3)
HGB BLD-MCNC: 11.9 G/DL (ref 12.1–17)
IMM GRANULOCYTES # BLD AUTO: 0.1 K/UL (ref 0–0.04)
IMM GRANULOCYTES NFR BLD AUTO: 1 % (ref 0–0.5)
LYMPHOCYTES # BLD: 2.3 K/UL (ref 0.8–3.5)
LYMPHOCYTES NFR BLD: 29 % (ref 12–49)
MAGNESIUM SERPL-MCNC: 2.1 MG/DL (ref 1.6–2.4)
MCH RBC QN AUTO: 29.5 PG (ref 26–34)
MCHC RBC AUTO-ENTMCNC: 31.5 G/DL (ref 30–36.5)
MCV RBC AUTO: 93.8 FL (ref 80–99)
MONOCYTES # BLD: 0.6 K/UL (ref 0–1)
MONOCYTES NFR BLD: 8 % (ref 5–13)
NEUTS SEG # BLD: 4.7 K/UL (ref 1.8–8)
NEUTS SEG NFR BLD: 61 % (ref 32–75)
NRBC # BLD: 0 K/UL (ref 0–0.01)
NRBC BLD-RTO: 0 PER 100 WBC
PHOSPHATE SERPL-MCNC: 2.2 MG/DL (ref 2.6–4.7)
PLATELET # BLD AUTO: 193 K/UL (ref 150–400)
PMV BLD AUTO: 8.6 FL (ref 8.9–12.9)
POTASSIUM SERPL-SCNC: 3.9 MMOL/L (ref 3.5–5.1)
PROT SERPL-MCNC: 6 G/DL (ref 6.4–8.2)
RBC # BLD AUTO: 4.03 M/UL (ref 4.1–5.7)
SODIUM SERPL-SCNC: 142 MMOL/L (ref 136–145)
WBC # BLD AUTO: 7.7 K/UL (ref 4.1–11.1)

## 2019-01-12 PROCEDURE — 74011250636 HC RX REV CODE- 250/636: Performed by: INTERNAL MEDICINE

## 2019-01-12 PROCEDURE — 80053 COMPREHEN METABOLIC PANEL: CPT

## 2019-01-12 PROCEDURE — 74011000250 HC RX REV CODE- 250: Performed by: INTERNAL MEDICINE

## 2019-01-12 PROCEDURE — 36415 COLL VENOUS BLD VENIPUNCTURE: CPT

## 2019-01-12 PROCEDURE — 85025 COMPLETE CBC W/AUTO DIFF WBC: CPT

## 2019-01-12 PROCEDURE — 83735 ASSAY OF MAGNESIUM: CPT

## 2019-01-12 PROCEDURE — 84100 ASSAY OF PHOSPHORUS: CPT

## 2019-01-12 PROCEDURE — 74011250636 HC RX REV CODE- 250/636: Performed by: HOSPITALIST

## 2019-01-12 RX ORDER — FOLIC ACID 1 MG/1
1 TABLET ORAL DAILY
Qty: 30 TAB | Refills: 1 | OUTPATIENT
Start: 2019-01-12 | End: 2022-01-19

## 2019-01-12 RX ORDER — LANOLIN ALCOHOL/MO/W.PET/CERES
100 CREAM (GRAM) TOPICAL DAILY
Qty: 30 TAB | Refills: 1 | OUTPATIENT
Start: 2019-01-12 | End: 2022-01-19

## 2019-01-12 RX ADMIN — FAMOTIDINE 20 MG: 10 INJECTION, SOLUTION INTRAVENOUS at 08:27

## 2019-01-12 RX ADMIN — SODIUM CHLORIDE 75 ML/HR: 900 INJECTION, SOLUTION INTRAVENOUS at 02:18

## 2019-01-12 RX ADMIN — Medication 10 ML: at 06:00

## 2019-01-12 NOTE — PROGRESS NOTES
7:40-Bedside and Verbal shift change report given to Mamadou Butler (oncoming nurse) by Atilio Decker (offgoing nurse). Report included the following information SBAR, Kardex, ED Summary, Procedure Summary, Intake/Output, MAR, Accordion, Recent Results, Med Rec Status, Cardiac Rhythm SR, Alarm Parameters , Pre Procedure Checklist, Procedure Verification and Quality Measures.

## 2019-01-12 NOTE — PROGRESS NOTES
The patient was admitted in the hospital from 1/11/2019 to 1/12/2019. The patient can go back to work on 1/13/2019 if continues to remain stable.

## 2019-01-12 NOTE — PROGRESS NOTES
8:10- AM assessment completed. Pt is alert and oriented, no c/o pain or SOB. Precedex gtt stopped per Dr. Dalton Raymond. Pt is calm and cooperative, asking when he can go home. 8:30- Paged Dr. Luigi Veliz to review and see if pt can be discharged per his request. 
8:45- Dr. Luigi Veliz at bedside, new orders for discharge. 9:10- Pt noticed one of his shoes is missing. Called to ED, no sign of missing red tennis shoe. Pt is unsure if he was wearing both shoes when he was brought to the ED. 10:00- Pt was discharged home, his brother took him home per his vehicle. Was taken to patient discharge by volunteer. Discharge instructions explained and all questions answered by RN. Brother brought in patient's other shoe that was considered missing.

## 2019-01-12 NOTE — DISCHARGE SUMMARY
Discharge Summary       PATIENT ID: Sara Higginbotham  MRN: [id number]   YOB: 1985    DATE OF ADMISSION: 1/11/2019  5:05 AM    DATE OF DISCHARGE: 1/12/2019   PRIMARY CARE PROVIDER: None     ATTENDING PHYSICIAN: Dr Maralyn Prader  DISCHARGING PROVIDER: Maralyn Prader, MD    To contact this individual call [phone] and ask the  to page. If unavailable ask to be transferred the Adult Hospitalist Department. CONSULTATIONS: IP CONSULT TO HOSPITALIST  IP CONSULT TO INTENSIVIST    PROCEDURES/SURGERIES: * No surgery found *    ADMITTING [address] [place] [address] COURSE:   Alcohol intoxication  -The patient was put on precedex/versed drip. Now the patient is off both. -Wants to get discharged, claims otherwise will sign AMA  -I think it is reasonable that the patient be discharged home. Told him that currently he does not have s/s of withdrawal but he soon can go into withdrawal. At that time he may need to come into the hospital. He says he does not drink alcohol regularly. Also, per him, he had 3 beers before being brought to the hospital. No reported non-prescription drugs    Alcohol abuse  -Told him about the high level of alcohol at the time of admission  -Counseled, the patient agrees    Hypernatremia  -sec to dehydration  -now resolved    Hypomagnesemia  -replaced     Acute respiratory failure  -intubated 1/11 sec to airway protection  -extubated 1/11    Regular diet    Code status: FULL CODE  DVT prophylaxis: scd  PTA: home        [address] / PLAN:      1.   Alcohol intoxication       PENDING TEST RESULTS:   At the time of discharge the following test results are still pending: none    FOLLOW UP APPOINTMENTS:    Follow-up Information     Follow up With Specialties Details Why Contact Info    PCP  In 1 week             ADDITIONAL CARE RECOMMENDATIONS:   Follow up with PMD    DIET: Cardiac Diet    ACTIVITY: Activity as tolerated and no driving till cleared by PMD      DISCHARGE MEDICATIONS:  Current Discharge Medication List      START taking these medications    Details   thiamine HCL (B-1) 100 mg tablet Take 1 Tab by mouth daily. Qty: 30 Tab, Refills: 1      folic acid (FOLVITE) 1 mg tablet Take 1 Tab by mouth daily. Qty: 30 Tab, Refills: 1      mv. min cmb#[id number] (AQUADEKS) 100-350-5 mcg-mcg-mg chew Take 1 Tab by mouth daily. Qty: 30 Tab, Refills: 1               NOTIFY YOUR PHYSICIAN FOR ANY OF THE FOLLOWING:   Fever over 101 degrees for 24 hours. Chest pain, shortness of breath, fever, chills, nausea, vomiting, diarrhea, change in mentation, falling, weakness, bleeding. Severe pain or pain not relieved by medications. Or, any other signs or symptoms that you may have questions about.     DISPOSITION:  x  Home With:   OT  PT  HH  RN       Long term SNF/Inpatient Rehab    Independent/assisted living    Hospice    Other:       PATIENT CONDITION AT DISCHARGE:     Functional status    Poor     Deconditioned    x Independent      Cognition    x Lucid     Forgetful     Dementia      Catheters/lines (plus indication)    [place]     PICC     PEG    x None      Code status   x  Full code     DNR      PHYSICAL EXAMINATION AT DISCHARGE:  Please see progress note      CHRONIC MEDICAL DIAGNOSES:  Problem List as of 1/12/2019 Date Reviewed: 1/11/2019          Codes Class Noted - Resolved    * (Principal) [facility] ICD-10-CM: N53.859  ICD-9-CM: 305.00  1/11/2019 - Present        Abdominal pain, other specified site ICD-10-CM: R10.9  ICD-9-CM: 789.09  6/27/2012 - Present        Persistent vomiting ICD-10-CM: R11.10  ICD-9-CM: 536.2  6/27/2012 - Present        Acute pancreatitis ICD-10-CM: K85.90  ICD-9-CM: 576.3  6/27/2012 - Present              Greater than 37 minutes were spent with the patient on counseling and coordination of care    Signed:   Nikki Davis MD  1/12/2019  8:53 AM

## 2019-01-12 NOTE — DISCHARGE INSTRUCTIONS
Discharge Instructions       PATIENT ID: Arlet Cruz  MRN: [id number]   YOB: 1985    DATE OF ADMISSION: 1/11/2019  5:05 AM    DATE OF DISCHARGE: 1/12/2019    PRIMARY CARE PROVIDER: None     ATTENDING PHYSICIAN: Jarod France MD  DISCHARGING PROVIDER: Peter Regalado MD    To contact this individual call [phone] and ask the  to page. If unavailable ask to be transferred the Adult Hospitalist Department. DISCHARGE DIAGNOSES   Alcohol intoxication    CONSULTATIONS: IP CONSULT TO HOSPITALIST  IP CONSULT TO INTENSIVIST    PROCEDURES/SURGERIES: * No surgery found *    PENDING TEST RESULTS:   At the time of discharge the following test results are still pending: none    FOLLOW UP APPOINTMENTS:   Follow-up Information     Follow up With Specialties Details Why Contact Info    PCP  In 1 week             ADDITIONAL CARE RECOMMENDATIONS:   Follow up with PMD    DIET: Cardiac Diet    ACTIVITY: Activity as tolerated and no driving till cleared by PMD      DISCHARGE MEDICATIONS:   See Medication Reconciliation Form    · It is important that you take the medication exactly as they are prescribed. · Keep your medication in the bottles provided by the pharmacist and keep a list of the medication names, dosages, and times to be taken in your wallet. · Do not take other medications without consulting your doctor. NOTIFY YOUR PHYSICIAN FOR ANY OF THE FOLLOWING:   Fever over 101 degrees for 24 hours. Chest pain, shortness of breath, fever, chills, nausea, vomiting, diarrhea, change in mentation, falling, weakness, bleeding. Severe pain or pain not relieved by medications. Or, any other signs or symptoms that you may have questions about.       DISPOSITION:  x  Home With:   OT  PT  HH  RN       SNF/Inpatient Rehab/LTAC    Independent/assisted living    Hospice    Other:     CDMP Checked:   Yes x     PROBLEM LIST Updated:  Yes x       Signed:   Peter Regalado MD  1/12/2019  8:52 AM

## 2019-01-12 NOTE — PROGRESS NOTES
Hospitalist Progress Note Wallace Melendez MD 
Answering service: [phone] from in house phone Cell: [phone] Date of Service:  2019 NAME:  Sulema Mustafa :  1985 MRN:  [id number] Admission Summary: This is a 14-year-old male. Please note that I was not able to get any meaningful history from the patient as the patient is altered mental state and is already intubated and sedated by the ER physician. The whole history is as per the ER physician and nurses as well as the documentation in the chart. Since that it seems that the patient is an alcoholic and lately he has been drinking alcohol more than usual, yesterday the patient fell on the ground and was taken to [facility] where he was being managed but overnight the patient got increasingly agitated and aggressive. That is when the patient was given Versed, ketamine and Haldol, but despite significant doses the patient continued to get worse, that is why the patient was transferred to [facility] for escalation of care. In the ER, the patient was evaluated and was sedated and intubated and was given to the hospitalist team for further management. Also, the patient was found to have some electrolyte abnormalities which included low magnesium and high sodium. Interval history / Subjective:  
  F/u alcohol intoxication Assessment & Plan: Alcohol intoxication 
-The patient was put on precedex/versed drip. Now the patient is off both. -Wants to get discharged, claims otherwise will sign AMA 
-I think it is reasonable that the patient be discharged home. Told him that currently he does not have s/s of withdrawal but he soon can go into withdrawal. At that time he may need to come into the hospital. He says he does not drink alcohol regularly.  Also, per him, he had 3 beers before being brought to the hospital. No reported non-prescription drugs Alcohol abuse 
-Told him about the high level of alcohol at the time of admission 
-Counseled, the patient agrees Hypernatremia 
-sec to dehydration -now resolved Hypomagnesemia 
-replaced Acute respiratory failure 
-intubated 1/11 sec to airway protection 
-extubated 1/11 Regular diet Code status: FULL CODE 
DVT prophylaxis: scd PTA: home Plan: Discharge home Care Plan discussed with: Patient/Family Disposition: Home w/Family Hospital Problems  Date Reviewed: 1/11/2019 Codes Class Noted POA * (Principal) Alcohol intoxication ([facility] [address] ICD-10-CM: U25.239 ICD-9-CM: 305.00  1/11/2019 Yes Review of Systems: A comprehensive review of systems was negative except for that written in the HPI. Vital Signs:  
 Last 24hrs VS reviewed since prior progress note. Most recent are: 
Visit Vitals /85 Pulse 65 Temp 98.2 °F (36.8 °C) Resp 19 Wt 77 kg (169 lb 12.1 oz) SpO2 95% BMI 29.14 kg/m² Intake/Output Summary (Last 24 hours) at 1/12/2019 9356 Last data filed at 1/12/2019 0700 Gross per 24 hour Intake 2077.88 ml Output 1400 ml Net 677.88 ml Physical Examination:  
 
 
     
Constitutional:  No acute distress, cooperative, pleasant   
ENT:  Oral mucous moist, oropharynx benign. Neck supple, Resp:  CTA bilaterally. No wheezing/rhonchi/rales. No accessory muscle use CV:  Regular rhythm, normal rate, no murmurs, gallops, rubs GI:  Soft, non distended, non tender. normoactive bowel sounds, no hepatosplenomegaly Musculoskeletal:  No edema, warm, 2+ pulses throughout Neurologic:  Moves all extremities. AAOx3, CN II-XII reviewed Skin:  Good turgor, no rashes or ulcers Data Review:  
 Review and/or order of clinical lab test 
 
 
Labs:  
 
Recent Labs  
  01/12/19 
0639 01/11/19 
6084 WBC 7.7 7.8 HGB 11.9* 13.5 HCT 37.8 41.5  214 Recent Labs  
  01/12/19 
5217 01/11/19 
0704 01/11/19 2127 01/10/19 
2211   --  143 148* K 3.9  --  4.0 2.7*  
*  --  110* 114* CO2 22  --  25 19* BUN 9  --  7 8 CREA 0.69*  --  0.61* 0.45* GLU 80  --  96 76 CA 7.5*  --  7.4* 6.2*  
MG 2.1 2.2  --  1.5* PHOS 2.2*  --   --   --   
 
Recent Labs  
  01/12/19 
0639 01/11/19 
0459 01/10/19 
2211 SGOT 31 35 17 ALT 27 30 20 AP 81 82 63 TBILI 0.5 0.2 0.1* TP 6.0* 7.1 5.2* ALB 3.0* 3.5 2.6*  
GLOB 3.0 3.6 2.6 LPSE  --   --  303 No results for input(s): INR, PTP, APTT in the last 72 hours. No lab exists for component: INREXT No results for input(s): FE, TIBC, PSAT, FERR in the last 72 hours. No results found for: FOL, RBCF No results for input(s): PH, PCO2, PO2 in the last 72 hours. No results for input(s): CPK, CKNDX, TROIQ in the last 72 hours. No lab exists for component: CPKMB No results found for: CHOL, CHOLX, CHLST, CHOLV, HDL, LDL, LDLC, DLDLP, TGLX, TRIGL, TRIGP, CHHD, CHHDX Lab Results Component Value Date/Time Glucose (POC) 111 (H) 01/11/2019 03:06 AM  
 Glucose (POC) 117 (H) 01/10/2011 09:32 PM  
 
Lab Results Component Value Date/Time Color YELLOW/STRAW 01/11/2019 06:32 AM  
 Appearance CLEAR 01/11/2019 06:32 AM  
 Specific gravity 1.012 01/11/2019 06:32 AM  
 pH (UA) 7.0 01/11/2019 06:32 AM  
 Protein NEGATIVE  01/11/2019 06:32 AM  
 Glucose NEGATIVE  01/11/2019 06:32 AM  
 Ketone NEGATIVE  01/11/2019 06:32 AM  
 Bilirubin NEGATIVE  01/11/2019 06:32 AM  
 Urobilinogen 0.2 01/11/2019 06:32 AM  
 Nitrites NEGATIVE  01/11/2019 06:32 AM  
 Leukocyte Esterase NEGATIVE  01/11/2019 06:32 AM  
 Epithelial cells FEW 01/11/2019 06:32 AM  
 Bacteria NEGATIVE  01/11/2019 06:32 AM  
 WBC 0-4 01/11/2019 06:32 AM  
 RBC 0-5 01/11/2019 06:32 AM  
 
 
 
Medications Reviewed:  
 
Current Facility-Administered Medications Medication Dose Route Frequency  midazolam in normal saline (VERSED) 1 mg/mL infusion  5 mg/hr IntraVENous TITRATE  sodium chloride (NS) flush 5-40 mL  5-40 mL IntraVENous Q8H  
 sodium chloride (NS) flush 5-40 mL  5-40 mL IntraVENous PRN  
 0.9% sodium chloride infusion  75 mL/hr IntraVENous CONTINUOUS  
 ondansetron (ZOFRAN) injection 4 mg  4 mg IntraVENous Q4H PRN  
 LORazepam (ATIVAN) injection 2 mg  2 mg IntraVENous Q1H PRN  
 LORazepam (ATIVAN) injection 4 mg  4 mg IntraVENous Q1H PRN  
 0.9% sodium chloride 1,000 mL with mvi, adult no. 4 with vit K 10 mL, thiamine 431 mg, folic acid 1 mg infusion   IntraVENous Q24H  hydrALAZINE (APRESOLINE) 20 mg/mL injection 10 mg  10 mg IntraVENous Q6H PRN  
 famotidine (PF) (PEPCID) 20 mg in sodium chloride 0.9% 10 mL injection  20 mg IntraVENous Q12H  chlorhexidine (PERIDEX) 0.12 % mouthwash 15 mL  15 mL Oral BID  nicotine (NICODERM CQ) 21 mg/24 hr patch 1 Patch  1 Patch TransDERmal DAILY  dexmedeTOMidine (PRECEDEX) 400 mcg in 0.9% sodium chloride 100 mL infusion  0.2-0.7 mcg/kg/hr IntraVENous TITRATE  
 
______________________________________________________________________ EXPECTED LENGTH OF STAY: - - - 
ACTUAL LENGTH OF STAY:          1 Simin Villavicencio MD

## 2019-03-26 ENCOUNTER — HOSPITAL ENCOUNTER (EMERGENCY)
Age: 34
Discharge: HOME | End: 2019-03-26
Attending: EMERGENCY MEDICINE | Admitting: EMERGENCY MEDICINE
Payer: COMMERCIAL

## 2019-03-26 ENCOUNTER — APPOINTMENT (OUTPATIENT)
Dept: CT IMAGING | Age: 34
End: 2019-03-26
Attending: PHYSICIAN ASSISTANT
Payer: COMMERCIAL

## 2019-03-26 VITALS
TEMPERATURE: 99.6 F | HEIGHT: 64 IN | SYSTOLIC BLOOD PRESSURE: 134 MMHG | RESPIRATION RATE: 20 BRPM | DIASTOLIC BLOOD PRESSURE: 90 MMHG | OXYGEN SATURATION: 98 % | WEIGHT: 191.14 LBS | BODY MASS INDEX: 32.63 KG/M2 | HEART RATE: 110 BPM

## 2019-03-26 DIAGNOSIS — M54.31 BILATERAL SCIATICA: ICD-10-CM

## 2019-03-26 DIAGNOSIS — S39.012A STRAIN OF LUMBAR REGION, INITIAL ENCOUNTER: Primary | ICD-10-CM

## 2019-03-26 DIAGNOSIS — M54.32 BILATERAL SCIATICA: ICD-10-CM

## 2019-03-26 PROCEDURE — 74011250637 HC RX REV CODE- 250/637: Performed by: PHYSICIAN ASSISTANT

## 2019-03-26 PROCEDURE — 72131 CT LUMBAR SPINE W/O DYE: CPT

## 2019-03-26 PROCEDURE — 99283 EMERGENCY DEPT VISIT LOW MDM: CPT

## 2019-03-26 RX ORDER — PREDNISONE 10 MG/1
TABLET ORAL
Qty: 48 TAB | Refills: 0 | OUTPATIENT
Start: 2019-03-26 | End: 2020-03-23

## 2019-03-26 RX ORDER — NAPROXEN 500 MG/1
500 TABLET ORAL 2 TIMES DAILY WITH MEALS
Qty: 20 TAB | Refills: 0 | Status: SHIPPED | OUTPATIENT
Start: 2019-03-26 | End: 2020-03-23

## 2019-03-26 RX ORDER — NAPROXEN 250 MG/1
500 TABLET ORAL
Status: COMPLETED | OUTPATIENT
Start: 2019-03-26 | End: 2019-03-26

## 2019-03-26 RX ADMIN — NAPROXEN 500 MG: 250 TABLET ORAL at 20:45

## 2019-03-26 NOTE — ED TRIAGE NOTES
Pt comes in with c/o shooting, upper back pain after helping move dresser last night. Has not taken any medication for it yet.

## 2019-03-26 NOTE — LETTER
[facility] EMERGENCY DEPT 
[address] [id number] 
[phone] Work/School Note Date: 3/26/2019 To Whom It May concern: 
 
Pee Bautista was seen and treated today in the emergency room by the following provider(s): 
Attending Provider: Pattie Kendall MD 
Physician Assistant: ASHWINI Lazar. Pee Bautista may return to work on 3/28/2019. Sincerely, ASHWINI Garcia
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
## 2019-03-27 NOTE — ED PROVIDER NOTES
EMERGENCY DEPARTMENT HISTORY AND PHYSICAL EXAM 
 
 
Date: 3/26/2019 Patient Name: Raf Steele History of Presenting Illness Chief Complaint Patient presents with  Back Pain History Provided By: Patient HPI: Raf Steele, 28 y.o. male with no significant PMHx, presents ambulatory to the ED with cc of sharp shooting aching low back pain that started last night. Patient states he was moving a dresser when he heard a pop. Patient reports intermittent pain radiating down both legs bilaterally. Denies saddle anesthesia, loss of bowel or bladder function. Has not taken anything for symptoms. Pain 10 out of 10. Pain worse with movement. Denies history of herniated disc. There are no other complaints, changes, or physical findings at this time. PCP: None No current facility-administered medications on file prior to encounter. Current Outpatient Medications on File Prior to Encounter Medication Sig Dispense Refill  thiamine HCL (B-1) 100 mg tablet Take 1 Tab by mouth daily. 30 Tab 1  
 folic acid (FOLVITE) 1 mg tablet Take 1 Tab by mouth daily. 30 Tab 1  
 mv. min cmb#51-FA-K-Q10 (AQUADEKS) 100-350-5 mcg-mcg-mg chew Take 1 Tab by mouth daily. 30 Tab 1 Past History Past Medical History: 
History reviewed. No pertinent past medical history. Past Surgical History: 
History reviewed. No pertinent surgical history. Family History: 
History reviewed. No pertinent family history. Social History: 
Social History Tobacco Use  Smoking status: Current Some Day Smoker Packs/day: 1.00  Smokeless tobacco: Never Used Substance Use Topics  Alcohol use: Yes Comment: socially  Drug use: Not Currently Types: Marijuana Allergies: Allergies Allergen Reactions  Pcn [Penicillins] Anaphylaxis Review of Systems Review of Systems Constitutional: Negative for chills and fever. HENT: Negative for facial swelling. Eyes: Negative for photophobia and visual disturbance. Respiratory: Negative for shortness of breath. Cardiovascular: Negative for chest pain. Gastrointestinal: Negative for abdominal pain, nausea and vomiting. Genitourinary: Negative for flank pain. Musculoskeletal: Positive for back pain. Skin: Negative for color change, pallor, rash and wound. Neurological: Negative for dizziness, weakness, light-headedness and headaches. All other systems reviewed and are negative. Physical Exam  
Physical Exam  
Constitutional: He is oriented to person, place, and time. He appears well-developed and well-nourished. No distress. HENT:  
Head: Normocephalic and atraumatic. Eyes: Conjunctivae are normal.  
Cardiovascular: Normal rate, regular rhythm and normal heart sounds. Pulmonary/Chest: Effort normal and breath sounds normal. No respiratory distress. Abdominal: Soft. Bowel sounds are normal. There is no tenderness. Musculoskeletal:  
DP pulses strong and equal b/l 
(+) SLR Neurological: He is alert and oriented to person, place, and time. No cranial nerve deficit. Skin: Skin is warm. No rash noted. Psychiatric: He has a normal mood and affect. His behavior is normal.  
Nursing note and vitals reviewed. Diagnostic Study Results Labs - No results found for this or any previous visit (from the past 12 hour(s)). Radiologic Studies -  
CT SPINE LUMB WO CONT Final Result IMPRESSION:  
  
1. No acute fracture. 2. Nonspecific heterogeneous bone mineralization, could be due to underlying  
metabolic disease or other marrow replacement processes. Correlation with  
clinical findings is recommended. CT Results  (Last 48 hours) 03/26/19 5593  CT SPINE LUMB WO CONT Final result Impression:  IMPRESSION:  
   
1. No acute fracture. 2. Nonspecific heterogeneous bone mineralization, could be due to underlying metabolic disease or other marrow replacement processes. Correlation with  
clinical findings is recommended. Narrative:  INDICATION: Back pain, unspecified; trauma COMPARISON: None. TECHNIQUE:   Noncontrast axial CT imaging of the lumbar spine was performed. Coronal and sagittal reconstructions were obtained. CT dose reduction was achieved through use of a standardized protocol tailored  
for this examination and automatic exposure control for dose modulation. FINDINGS:  
   
There is straightening of the lumbar spine. No evidence of acute fracture. There  
are patchy areas of lucency throughout the visualized vertebral bodies and  
sacrum. Mild degenerative changes, particularly at L4-5 where disc bulge causes  
mild spinal canal and at least mild bilateral foraminal stenosis. No paraspinal  
soft tissue abnormality. Small linear calcification at the junction of the right  
renal vein and inferior vena cava is unchanged. CXR Results  (Last 48 hours) None Medical Decision Making I am the first provider for this patient. I reviewed the vital signs, available nursing notes, past medical history, past surgical history, family history and social history. Vital Signs-Reviewed the patient's vital signs. No data found. Records Reviewed: Nursing Notes and Old Medical Records Provider Notes (Medical Decision Making): DDx: Herniated disc, Sciatica, Piriformis syndrome, lumbar strain, muscle spasm ED Course:  
Initial assessment performed. The patients presenting problems have been discussed, and they are in agreement with the care plan formulated and outlined with them. I have encouraged them to ask questions as they arise throughout their visit. Disposition: 
2:10 PM 
Discussed imaging results with pt along with dx and treatment plan. Discussed importance of PCP follow up. All questions answered.  Pt voiced they understood. Return if sx worsen. PLAN: 
1. Discharge Medication List as of 3/26/2019  9:56 PM  
  
START taking these medications Details  
predniSONE (STERAPRED DS) 10 mg dose pack Take as instructed, Normal, Disp-48 Tab, R-0  
  
naproxen (NAPROSYN) 500 mg tablet Take 1 Tab by mouth two (2) times daily (with meals). , Normal, Disp-20 Tab, R-0  
  
  
CONTINUE these medications which have NOT CHANGED Details  
thiamine HCL (B-1) 100 mg tablet Take 1 Tab by mouth daily. , Print, Disp-30 Tab, R-1  
  
folic acid (FOLVITE) 1 mg tablet Take 1 Tab by mouth daily. , Print, Disp-30 Tab, R-1  
  
mv. min cmb#51-FA-K-Q10 (AQUADEKS) 100-350-5 mcg-mcg-mg chew Take 1 Tab by mouth daily. , Print, Disp-30 Tab, R-1  
  
  
 
2. Follow-up Information Follow up With Specialties Details Why Contact [facility]  Schedule an appointment as soon as possible for a visit in 1 day  [address] [place] [address] [address] 
[phone] Return to ED if worse Diagnosis Clinical Impression: 1. Strain of lumbar region, initial encounter 2. Bilateral sciatica

## 2019-03-27 NOTE — DISCHARGE INSTRUCTIONS
Patient Education        Back Pain: Care Instructions  Your Care Instructions    Back pain has many possible causes. It is often related to problems with muscles and ligaments of the back. It may also be related to problems with the nerves, discs, or bones of the back. Moving, lifting, standing, sitting, or sleeping in an awkward way can strain the back. Sometimes you don't notice the injury until later. Arthritis is another common cause of back pain. Although it may hurt a lot, back pain usually improves on its own within several weeks. Most people recover in 12 weeks or less. Using good home treatment and being careful not to stress your back can help you feel better sooner. Follow-up care is a key part of your treatment and safety. Be sure to make and go to all appointments, and call your doctor if you are having problems. It's also a good idea to know your test results and keep a list of the medicines you take. How can you care for yourself at home? · Sit or lie in positions that are most comfortable and reduce your pain. Try one of these positions when you lie down:  ? Lie on your back with your knees bent and supported by large pillows. ? Lie on the floor with your legs on the seat of a sofa or chair. ? Lie on your side with your knees and hips bent and a pillow between your legs. ? Lie on your stomach if it does not make pain worse. · Do not sit up in bed, and avoid soft couches and twisted positions. Bed rest can help relieve pain at first, but it delays healing. Avoid bed rest after the first day of back pain. · Change positions every 30 minutes. If you must sit for long periods of time, take breaks from sitting. Get up and walk around, or lie in a comfortable position. · Try using a heating pad on a low or medium setting for 15 to 20 minutes every 2 or 3 hours. Try a warm shower in place of one session with the heating pad. · You can also try an ice pack for 10 to 15 minutes every 2 to 3 hours. Put a thin cloth between the ice pack and your skin. · Take pain medicines exactly as directed. ? If the doctor gave you a prescription medicine for pain, take it as prescribed. ? If you are not taking a prescription pain medicine, ask your doctor if you can take an over-the-counter medicine. · Take short walks several times a day. You can start with 5 to 10 minutes, 3 or 4 times a day, and work up to longer walks. Walk on level surfaces and avoid hills and stairs until your back is better. · Return to work and other activities as soon as you can. Continued rest without activity is usually not good for your back. · To prevent future back pain, do exercises to stretch and strengthen your back and stomach. Learn how to use good posture, safe lifting techniques, and proper body mechanics. When should you call for help? Call your doctor now or seek immediate medical care if:    · You have new or worsening numbness in your legs.     · You have new or worsening weakness in your legs. (This could make it hard to stand up.)     · You lose control of your bladder or bowels.    Watch closely for changes in your health, and be sure to contact your doctor if:    · You have a fever, lose weight, or don't feel well.     · You do not get better as expected. Where can you learn more? Go to http://rosa-yamini.info/. Enter W073 in the search box to learn more about \"Back Pain: Care Instructions. \"  Current as of: September 20, 2018  Content Version: 11.9  © 7354-1429 [employer]. Care instructions adapted under license by [employer] (which disclaims liability or warranty for this information). If you have questions about a medical condition or this instruction, always ask your healthcare professional. [place] [address] any warranty or liability for your use of this information.          Patient Education        Sciatica: Care Instructions  Your Care Instructions    Sciatica (say \"jrc-FT-kq-kuh\") is an irritation of one of the sciatic nerves, which come from the spinal cord in the lower back. The sciatic nerves and their branches extend down through the buttock to the foot. Sciatica can develop when an injured disc in the back presses against a spinal nerve root. Its main symptom is pain, numbness, or weakness that is often worse in the leg or foot than in the back. Sciatica often will improve and go away with time. Early treatment usually includes medicines and exercises to relieve pain. Follow-up care is a key part of your treatment and safety. Be sure to make and go to all appointments, and call your doctor if you are having problems. It's also a good idea to know your test results and keep a list of the medicines you take. How can you care for yourself at home? · Take pain medicines exactly as directed. ? If the doctor gave you a prescription medicine for pain, take it as prescribed. ? If you are not taking a prescription pain medicine, ask your doctor if you can take an over-the-counter medicine. · Use heat or ice to relieve pain. ? To apply heat, put a warm water bottle, heating pad set on low, or warm cloth on your back. Do not go to sleep with a heating pad on your skin. ? To use ice, put ice or a cold pack on the area for 10 to 20 minutes at a time. Put a thin cloth between the ice and your skin. · Avoid sitting if possible, unless it feels better than standing. · Alternate lying down with short walks. Increase your walking distance as you are able to without making your symptoms worse. · Do not do anything that makes your symptoms worse. When should you call for help? Call 911 anytime you think you may need emergency care. For example, call if:    · You are unable to move a leg at all.   [facility] your doctor now or seek immediate medical care if:    · You have new or worse symptoms in your legs or buttocks.  Symptoms may include:  ? Numbness or tingling. ? Weakness. ? Pain.     · You lose bladder or bowel control.    Watch closely for changes in your health, and be sure to contact your doctor if:    · You are not getting better as expected. Where can you learn more? Go to http://rosa-yamini.info/. Enter [phone] in the search box to learn more about \"Sciatica: Care Instructions. \"  Current as of: September 20, 2018  Content Version: 11.9  © 8877-0136 [employer]. Care instructions adapted under license by [employer] (which disclaims liability or warranty for this information). If you have questions about a medical condition or this instruction, always ask your healthcare professional. [address] any warranty or liability for your use of this information.

## 2020-03-23 ENCOUNTER — HOSPITAL ENCOUNTER (EMERGENCY)
Age: 35
Discharge: HOME | End: 2020-03-23
Attending: EMERGENCY MEDICINE
Payer: COMMERCIAL

## 2020-03-23 ENCOUNTER — APPOINTMENT (OUTPATIENT)
Dept: GENERAL RADIOLOGY | Age: 35
End: 2020-03-23
Attending: PHYSICIAN ASSISTANT
Payer: COMMERCIAL

## 2020-03-23 VITALS
BODY MASS INDEX: 29.02 KG/M2 | OXYGEN SATURATION: 100 % | TEMPERATURE: 97.9 F | DIASTOLIC BLOOD PRESSURE: 89 MMHG | HEIGHT: 64 IN | RESPIRATION RATE: 18 BRPM | WEIGHT: 170 LBS | SYSTOLIC BLOOD PRESSURE: 120 MMHG | HEART RATE: 90 BPM

## 2020-03-23 DIAGNOSIS — R21 RASH AND OTHER NONSPECIFIC SKIN ERUPTION: ICD-10-CM

## 2020-03-23 DIAGNOSIS — J06.9 ACUTE URI: Primary | ICD-10-CM

## 2020-03-23 DIAGNOSIS — M94.0 COSTOCHONDRITIS: ICD-10-CM

## 2020-03-23 PROCEDURE — 71046 X-RAY EXAM CHEST 2 VIEWS: CPT

## 2020-03-23 PROCEDURE — 93005 ELECTROCARDIOGRAM TRACING: CPT

## 2020-03-23 PROCEDURE — 99283 EMERGENCY DEPT VISIT LOW MDM: CPT

## 2020-03-23 RX ORDER — NAPROXEN 500 MG/1
500 TABLET ORAL 2 TIMES DAILY WITH MEALS
Qty: 20 TAB | Refills: 0 | OUTPATIENT
Start: 2020-03-23 | End: 2022-01-19

## 2020-03-23 RX ORDER — BENZONATATE 100 MG/1
100 CAPSULE ORAL
Qty: 15 CAP | Refills: 0 | Status: SHIPPED | OUTPATIENT
Start: 2020-03-23 | End: 2020-03-28

## 2020-03-23 RX ORDER — MAG HYDROX/ALUMINUM HYD/SIMETH 200-200-20
SUSPENSION, ORAL (FINAL DOSE FORM) ORAL 2 TIMES DAILY
Qty: 15 G | Refills: 0 | OUTPATIENT
Start: 2020-03-23 | End: 2022-01-19

## 2020-03-23 NOTE — ED NOTES
Pt for DC home plan of care accepted by pt. Provider at bedside reviewing x-ray results. Patient (s)  given copy of dc instructions and 3 script(s). Patient (s)  verbalized understanding of instructions and script (s). Patient given a current medication reconciliation form and verbalized understanding of their medications. Patient (s) verbalized understanding of the importance of discussing medications with  his or her physician or clinic they will be following up with. Patient alert and oriented and in no acute distress. Patient discharged home ambulatory with self.

## 2020-03-23 NOTE — DISCHARGE INSTRUCTIONS
Patient Education        Costochondritis: Care Instructions  Your Care Instructions  You have chest pain because the cartilage of your rib cage is inflamed. This problem is called costochondritis. This type of chest wall pain may last from days to weeks. It is not a heart problem. Sometimes costochondritis occurs with a cold or the flu, and other times the exact cause is not known. Follow-up care is a key part of your treatment and safety. Be sure to make and go to all appointments, and call your doctor if you are having problems. It's also a good idea to know your test results and keep a list of the medicines you take. How can you care for yourself at home? · Take medicines for pain and inflammation exactly as directed. ? If the doctor gave you a prescription medicine, take it as prescribed. ? If you are not taking a prescription pain medicine, ask your doctor if you can take an over-the-counter medicine. ? Do not take two or more pain medicines at the same time unless the doctor told you to. Many pain medicines have acetaminophen, which is Tylenol. Too much acetaminophen (Tylenol) can be harmful. · It may help to use a warm compress or heating pad (set on low) on your chest. You can also try alternating heat and ice. Put ice or a cold pack on the area for 10 to 20 minutes at a time. Put a thin cloth between the ice and your skin. · Avoid any activity that strains the chest area. As your pain gets better, you can slowly return to your normal activities. · Do not use tape, an elastic bandage, a \"rib belt,\" or anything else that restricts your chest wall motion. When should you call for help? Call 911 anytime you think you may need emergency care. For example, call if:    · You have new or different chest pain or pressure. This may occur with:  ? Sweating. ? Shortness of breath. ? Nausea or vomiting. ? Pain that spreads from the chest to the neck, jaw, or one or both shoulders or arms.   ? Dizziness or lightheadedness. ? A fast or uneven pulse. After calling  911, chew 1 adult-strength aspirin. Wait for an ambulance. Do not try to drive yourself.     · You have severe trouble breathing.    Call your doctor now or seek immediate medical care if:    · You have a fever or cough.     · You have any trouble breathing.     · Your chest pain gets worse.    Watch closely for changes in your health, and be sure to contact your doctor if:    · Your chest pain continues even though you are taking anti-inflammatory medicine.     · Your chest wall pain has not improved after 5 to 7 days. Where can you learn more? Go to http://rosa-yamini.info/  Enter [id number] in the search box to learn more about \"Costochondritis: Care Instructions. \"  Current as of: June 26, 2019Content Version: 12.4  © 0561-6162 [employer]. Care instructions adapted under license by [employer] (which disclaims liability or warranty for this information). If you have questions about a medical condition or this instruction, always ask your healthcare professional. [place] [address] any warranty or liability for your use of this information. Patient Education        Rash: Care Instructions  Your Care Instructions  A rash is any irritation or inflammation of the skin. Rashes have many possible causes, including allergy, infection, illness, heat, and emotional stress. Follow-up care is a key part of your treatment and safety. Be sure to make and go to all appointments, and call your doctor if you are having problems. It's also a good idea to know your test results and keep a list of the medicines you take. How can you care for yourself at home? · Wash the area with water only. Soap can make dryness and itching worse. Pat dry. · Put cold, wet cloths on the rash to reduce itching. · Keep cool, and stay out of the sun.   · Leave the rash open to the air as much of the time as possible. · Sometimes petroleum jelly (Vaseline) can help relieve the discomfort caused by a rash. A moisturizing lotion, such as Cetaphil, also may help. Calamine lotion may help for rashes caused by contact with something (such as a plant or soap) that irritated the skin. Use it 3 or 4 times a day. · If your doctor prescribed a cream, use it as directed. If your doctor prescribed medicine, take it exactly as directed. · If your rash itches so badly that it interferes with your normal activities, take an over-the-counter antihistamine, such as diphenhydramine (Benadryl) or loratadine (Claritin). Read and follow all instructions on the label. When should you call for help? Call your doctor now or seek immediate medical care if:    · You have signs of infection, such as:  ? Increased pain, swelling, warmth, or redness. ? Red streaks leading from the area. ? Pus draining from the area. ? A fever.     · You have joint pain along with the rash.    Watch closely for changes in your health, and be sure to contact your doctor if:    · Your rash is changing or getting worse. For example, call if you have pain along with the rash, the rash is spreading, or you have new blisters.     · You do not get better after 1 week. Where can you learn more? Go to http://rosa-yamini.info/  Enter U711 in the search box to learn more about \"Rash: Care Instructions. \"  Current as of: October 30, 2019Content Version: 12.4  © 0317-8146 [employer]. Care instructions adapted under license by [employer] (which disclaims liability or warranty for this information). If you have questions about a medical condition or this instruction, always ask your healthcare professional. [place] [address] any warranty or liability for your use of this information.          Patient Education        Upper Respiratory Infection (Cold): Care Instructions  Your Care Instructions    An upper respiratory infection, or URI, is an infection of the nose, sinuses, or throat. URIs are spread by coughs, sneezes, and direct contact. The common cold is the most frequent kind of URI. The flu and sinus infections are other kinds of URIs. Almost all URIs are caused by viruses. Antibiotics won't cure them. But you can treat most infections with home care. This may include drinking lots of fluids and taking over-the-counter pain medicine. You will probably feel better in 4 to 10 days. The doctor has checked you carefully, but problems can develop later. If you notice any problems or new symptoms, get medical treatment right away. Follow-up care is a key part of your treatment and safety. Be sure to make and go to all appointments, and call your doctor if you are having problems. It's also a good idea to know your test results and keep a list of the medicines you take. How can you care for yourself at home? · To prevent dehydration, drink plenty of fluids, enough so that your urine is light yellow or clear like water. Choose water and other caffeine-free clear liquids until you feel better. If you have kidney, heart, or liver disease and have to limit fluids, talk with your doctor before you increase the amount of fluids you drink. · Take an over-the-counter pain medicine, such as acetaminophen (Tylenol), ibuprofen (Advil, Motrin), or naproxen (Aleve). Read and follow all instructions on the label. · Before you use cough and cold medicines, check the label. These medicines may not be safe for young children or for people with certain health problems. · Be careful when taking over-the-counter cold or flu medicines and Tylenol at the same time. Many of these medicines have acetaminophen, which is Tylenol. Read the labels to make sure that you are not taking more than the recommended dose. Too much acetaminophen (Tylenol) can be harmful. · Get plenty of rest.  · Do not smoke or allow others to smoke around you. If you need help quitting, talk to your doctor about stop-smoking programs and medicines. These can increase your chances of quitting for good. When should you call for help? Call 911 anytime you think you may need emergency care. For example, call if:    · You have severe trouble breathing.    Call your doctor now or seek immediate medical care if:    · You seem to be getting much sicker.     · You have new or worse trouble breathing.     · You have a new or higher fever.     · You have a new rash.    Watch closely for changes in your health, and be sure to contact your doctor if:    · You have a new symptom, such as a sore throat, an earache, or sinus pain.     · You cough more deeply or more often, especially if you notice more mucus or a change in the color of your mucus.     · You do not get better as expected. Where can you learn more? Go to http://rosa-yamini.info/  Enter K520 in the search box to learn more about \"Upper Respiratory Infection (Cold): Care Instructions. \"  Current as of: June 9, 2019Content Version: 12.4  © 2345-9905 Healthwise, Incorporated. Care instructions adapted under license by "[employer] (which disclaims liability or warranty for this information). If you have questions about a medical condition or this instruction, always ask your healthcare professional. [address] any warranty or liability for your use of this information.

## 2020-03-23 NOTE — ED PROVIDER NOTES
EMERGENCY DEPARTMENT HISTORY AND PHYSICAL EXAM    Date: 3/23/2020  Patient Name: Juno Ibrahim    History of Presenting Illness     Chief Complaint   Patient presents with    Cough         History Provided By: Patient    HPI: Juno Ibrahim is a 27 y.o. male with No significant past medical history who presents with cough, chest pain, sore throat, rash to the right arm and shortness of breath x2 to 3 days. Patient states he is concerned for a COVID-19 and wanted to come in and get checked since he lives in a hotel with his brother who is diabetic. Patient denies any sick contacts, no fevers or chills. Patient rates pain 8 out of 10. Cough is nonproductive. Patient is a smoker. PCP: None    Current Outpatient Medications   Medication Sig Dispense Refill    naproxen (NAPROSYN) 500 mg tablet Take 1 Tab by mouth two (2) times daily (with meals). 20 Tab 0    benzonatate (Tessalon Perles) 100 mg capsule Take 1 Cap by mouth three (3) times daily as needed for Cough for up to 5 days. 15 Cap 0    hydrocortisone (HYCORT) 1 % ointment Apply  to affected area two (2) times a day. use thin layer 15 g 0    thiamine HCL (B-1) 100 mg tablet Take 1 Tab by mouth daily. 30 Tab 1    folic acid (FOLVITE) 1 mg tablet Take 1 Tab by mouth daily. 30 Tab 1    mv. min cmb#51-[id number] (AQUADEKS) 100-350-5 mcg-mcg-mg chew Take 1 Tab by mouth daily. 30 Tab 1       Past History     Past Medical History:  History reviewed. No pertinent past medical history. Past Surgical History:  History reviewed. No pertinent surgical history. Family History:  History reviewed. No pertinent family history. Social History:  Social History     Tobacco Use    Smoking status: Current Some Day Smoker     Packs/day: 1.00    Smokeless tobacco: Never Used   Substance Use Topics    Alcohol use: Yes     Comment: socially    Drug use: Not Currently     Types: Marijuana       Allergies:   Allergies   Allergen Reactions    Pcn [Penicillins] Anaphylaxis         Review of Systems   Review of Systems   Constitutional: Negative for chills and fever. Allergic/Immunologic: Negative for immunocompromised state. Neurological: Negative for speech difficulty and weakness. All other systems reviewed and are negative. Physical Exam     Vitals:    03/23/20 1144 03/23/20 1332   BP: (!) 170/103 120/89   Pulse: 88 90   Resp: 18 18   Temp: 97.9 °F (36.6 °C)    SpO2: 100% 100%   Weight: 77.1 kg (170 lb)    Height: 5' 4\" (1.626 m)      Physical Exam  Vitals signs and nursing note reviewed. Constitutional:       General: He is not in acute distress. Appearance: He is well-developed. HENT:      Head: Normocephalic and atraumatic. Right Ear: Tympanic membrane normal.      Left Ear: Tympanic membrane normal.      Mouth/Throat:      Pharynx: No oropharyngeal exudate. Eyes:      Conjunctiva/sclera: Conjunctivae normal.   Cardiovascular:      Rate and Rhythm: Normal rate and regular rhythm. Heart sounds: Normal heart sounds. Pulmonary:      Effort: Pulmonary effort is normal. No respiratory distress. Breath sounds: Normal breath sounds. No wheezing or rales. Musculoskeletal: Normal range of motion. Skin:     General: Skin is warm and dry. Neurological:      Mental Status: He is alert and oriented to person, place, and time.            Diagnostic Study Results     Labs -     Recent Results (from the past 12 hour(s))   EKG, 12 LEAD, INITIAL    Collection Time: 03/23/20 12:23 PM   Result Value Ref Range    Ventricular Rate 74 BPM    Atrial Rate 74 BPM    P-R Interval 164 ms    QRS Duration 94 ms    Q-T Interval 378 ms    QTC Calculation (Bezet) 419 ms    Calculated P Axis 82 degrees    Calculated R Axis 70 degrees    Calculated T Axis 41 degrees    Diagnosis       Normal sinus rhythm  Normal ECG  When compared with ECG of 11-JAN-2019 06:47,  No significant change was found         Radiologic Studies -   XR CHEST PA LAT   Final Result IMPRESSION:   1. No evidence of active lung disease. 2. Interval removal of the endotracheal tube and the nasogastric tube. CT Results  (Last 48 hours)    None        CXR Results  (Last 48 hours)               03/23/20 1233  XR CHEST PA LAT Final result    Impression:  IMPRESSION:   1. No evidence of active lung disease. 2. Interval removal of the endotracheal tube and the nasogastric tube. Narrative:  Chest 2 views dated 3/23/2020       Comparison chest dated 1/11/2019       History is cough and shortness of breath x2 days       PA and lateral views of the chest were obtained. There has been interval removal   of the endotracheal tube and the nasogastric tube. The cardiac silhouette is   normal in size. There is hyperaeration of the lungs. No areas of lobar   consolidation are identified. The costophrenic angles are sharp in appearance. Medical Decision Making   I am the first provider for this patient. I reviewed the vital signs, available nursing notes, past medical history, past surgical history, family history and social history. Vital Signs-Reviewed the patient's vital signs. Records Reviewed: Old Medical Records    ED Course as of Mar 23 1519   Mon Mar 23, 2020   1230 ED EKG interpretation:  Rhythm: normal sinus rhythm; and regular . Rate (approx.): 74; Axis: normal; P wave: normal; QRS interval: normal ; ST/T wave: normal. This EKG was interpreted by ED attending, Dr Casaerz and Tiffanie Randle PA-C,ED Provider.        [facility]      ED Course User Index  [AH] Dio Hernandez PA-C          Disposition:  Discharged    DISCHARGE NOTE:   12:56p      Care plan outlined and precautions discussed. Patient has no new complaints, changes, or physical findings. Results of CXR were reviewed with the patient. All medications were reviewed with the patient; will d/c home. All of pt's questions and concerns were addressed.  Patient was instructed and agrees to follow up with PCP prn, as well as to return to the ED upon further deterioration. Patient is ready to go home. Follow-up Information     Follow up With Specialties Details Why [address] Internal Medicine Schedule an appointment as soon as possible for a visit in 1 week As needed Dari Milligan  [phone]          Discharge Medication List as of 3/23/2020 12:56 PM      START taking these medications    Details   benzonatate (Tessalon Perles) 100 mg capsule Take 1 Cap by mouth three (3) times daily as needed for Cough for up to 5 days. , Normal, Disp-15 Cap, R-0      hydrocortisone (HYCORT) 1 % ointment Apply  to affected area two (2) times a day. use thin layer, Normal, Disp-15 g, R-0         CONTINUE these medications which have CHANGED    Details   naproxen (NAPROSYN) 500 mg tablet Take 1 Tab by mouth two (2) times daily (with meals). , Normal, Disp-20 Tab, R-0         CONTINUE these medications which have NOT CHANGED    Details   thiamine HCL (B-1) 100 mg tablet Take 1 Tab by mouth daily. , Print, Disp-30 Tab, R-1      folic acid (FOLVITE) 1 mg tablet Take 1 Tab by mouth daily. , Print, Disp-30 Tab, R-1      mv. min cmb#51-FA-K-Q10 (AQUADEKS) 100-350-5 mcg-mcg-mg chew Take 1 Tab by mouth daily. , Print, Disp-30 Tab, R-1         STOP taking these medications       predniSONE (STERAPRED DS) 10 mg dose pack Comments:   Reason for Stopping:               Provider Notes (Medical Decision Making): The patient complains of nasal congestion, rhinorrhea, and sore throat. Has non-productive cough without dyspnea or wheezing. Symptoms likely an uncomplicated viral URI. DDx: COVID-19, bacterial sinusitis vs. pharyngitis, flu. Procedures:  Procedures    Please note that this dictation was completed with Dragon, computer voice recognition software.   Quite often unanticipated grammatical, syntax, homophones, and other interpretive errors are inadvertently transcribed by the computer software. Please disregard these errors. Additionally, please excuse any errors that have escaped final proofreading. Diagnosis     Clinical Impression:   1. Acute URI    2. Rash and other nonspecific skin eruption    3.  Costochondritis

## 2020-03-23 NOTE — ED NOTES
Pt here for cold sx for 5 days. Pt is A+Ox3 clear speaking NAD noted. Pt speaking in complete sentences. Emergency Department Nursing Plan of Care       The Nursing Plan of Care is developed from the Nursing assessment and Emergency Department Attending provider initial evaluation. The plan of care may be reviewed in the ED Provider note.     The Plan of Care was developed with the following considerations:   Patient / Family readiness to learn indicated by:verbalized understanding  Persons(s) to be included in education: patient  Barriers to Learning/Limitations:No    Signed     Paul Carson RN    3/23/2020   12:06 PM

## 2020-03-24 ENCOUNTER — PATIENT OUTREACH (OUTPATIENT)
Dept: INTERNAL MEDICINE CLINIC | Age: 35
End: 2020-03-24

## 2020-03-24 LAB
ATRIAL RATE: 74 BPM
CALCULATED P AXIS, ECG09: 82 DEGREES
CALCULATED R AXIS, ECG10: 70 DEGREES
CALCULATED T AXIS, ECG11: 41 DEGREES
DIAGNOSIS, 93000: NORMAL
P-R INTERVAL, ECG05: 164 MS
Q-T INTERVAL, ECG07: 378 MS
QRS DURATION, ECG06: 94 MS
QTC CALCULATION (BEZET), ECG08: 419 MS
VENTRICULAR RATE, ECG03: 74 BPM

## 2020-03-24 NOTE — PROGRESS NOTES
COVID-19 Screening Initial Follow-up Note    Patient contacted regarding COVID-19  risk. Care Transition Nurse/ Ambulatory Care Manager contacted the patient by telephone to perform post discharge assessment. Verified name and  with patient as identifiers. Provided introduction to self, and explanation of the CTN/ACM role, and reason for call due to risk factors for infection and/or exposure to COVID-19. [facility] ED Visit 3/23/2020 with c/o cough, rash, chest pain; diagnosis of Acute URI, rash and other non specific skin eruption, costochondritis. CXR in ED showed no evidence of active lung disease. Pt noted to be afebrile in ED. Symptoms reviewed with patient who verbalized the following symptoms: shortness of breath, no new/worsening symptoms       Due to no new or worsening symptoms encounter was not routed to provider for escalation. Patient has following risk factors of: None noted. . CTN/ACM reviewed discharge instructions, medical action plan and red flags such as increased shortness of breath, increasing fever and signs of decompensation with patient who verbalized understanding. Discussed exposure protocols and quarantine with CDC Guidelines What to do if you are sick with coronavirus disease  Patient who was given an opportunity for questions and concerns. The patient agrees to contact the [facility] exposure line [phone], [facility]  ([phone]) and PCP office for questions related to their healthcare. CTN/ACM provided contact information for future reference. Reviewed and educated patient on any new and changed medications related to discharge diagnosis; Rx at ED discharge naproxen, tessalon perles, hydrocortisone ointment.     Pt was advised at ED discharge to f/u with [address] [facility] post-discharge; ACM reminded patient today of recommended post-ED outpatient follow-up and provided patient with [address] contact information.      Plan for follow-up call in 5-7 days based on severity of symptoms and risk factors

## 2020-04-01 ENCOUNTER — PATIENT OUTREACH (OUTPATIENT)
Dept: INTERNAL MEDICINE CLINIC | Age: 35
End: 2020-04-01

## 2020-04-07 ENCOUNTER — PATIENT OUTREACH (OUTPATIENT)
Dept: INTERNAL MEDICINE CLINIC | Age: 35
End: 2020-04-07

## 2020-04-07 NOTE — PROGRESS NOTES
Patient resolved from Transition of Care episode on 4/7/2020. ACM/CTN was unsuccessful at contacting this patient today. Patient/family was provided the following resources and education related to COVID-19 during the initial call:                         Signs, symptoms and red flags related to COVID-19            CDC exposure and quarantine guidelines            [facility] exposure contact - [phone]            Contact for their local Department of Health                 Patient has not had any additional ED or hospital visits. No further outreach scheduled with this CTN/ACM. Episode of Care resolved. Patient has this CTN/ACM contact information if future needs arise.

## 2020-04-07 NOTE — PROGRESS NOTES
4/7/2020  12:50 PM    Patient resolved from Transition of Care episode on 4/7/2020  Patient/family has been provided the following resources and education related to COVID-19:                         Signs, symptoms and red flags related to COVID-19            CDC exposure and quarantine guidelines            [facility] exposure contact - [phone]            Contact for their local Department of Health                 Patient currently reports that the following symptoms have improved:  no new/worsening symptoms     No further outreach scheduled with this CTN/ACM. Episode of Care resolved. Patient has this CTN/ACM contact information if future needs arise.

## 2022-01-19 ENCOUNTER — HOSPITAL ENCOUNTER (EMERGENCY)
Age: 37
Discharge: HOME | End: 2022-01-19
Attending: STUDENT IN AN ORGANIZED HEALTH CARE EDUCATION/TRAINING PROGRAM

## 2022-01-19 VITALS
RESPIRATION RATE: 19 BRPM | HEART RATE: 97 BPM | SYSTOLIC BLOOD PRESSURE: 140 MMHG | BODY MASS INDEX: 22.99 KG/M2 | WEIGHT: 138 LBS | HEIGHT: 65 IN | TEMPERATURE: 98.1 F | OXYGEN SATURATION: 100 % | DIASTOLIC BLOOD PRESSURE: 82 MMHG

## 2022-01-19 DIAGNOSIS — G89.29 ACUTE EXACERBATION OF CHRONIC LOW BACK PAIN: Primary | ICD-10-CM

## 2022-01-19 DIAGNOSIS — M54.50 ACUTE EXACERBATION OF CHRONIC LOW BACK PAIN: Primary | ICD-10-CM

## 2022-01-19 PROCEDURE — 99282 EMERGENCY DEPT VISIT SF MDM: CPT

## 2022-01-19 RX ORDER — CYCLOBENZAPRINE HCL 10 MG
10 TABLET ORAL
Qty: 20 TABLET | Refills: 0 | Status: SHIPPED | OUTPATIENT
Start: 2022-01-19

## 2022-01-19 RX ORDER — IBUPROFEN 600 MG/1
600 TABLET ORAL
Qty: 20 TABLET | Refills: 0 | Status: SHIPPED | OUTPATIENT
Start: 2022-01-19

## 2022-01-19 NOTE — ED PROVIDER NOTES
EMERGENCY DEPARTMENT HISTORY AND PHYSICAL EXAM      Date: 1/19/2022  Patient Name: Fabian Yanez    History of Presenting Illness     Chief Complaint   Patient presents with    Back Pain     chronic right lower back pain, denies recent injury. PMHx of DJD. History Provided By: Patient    HPI: Fabian Yanez, 39 y.o. male with a stated history of DDD and chronic low back pain presents ambulatory to the ED with cc of months or longer of 10 out of 10 constant, achy low back pain that is worse with movement and for which she has found some, but no lasting improvement with OTC Tylenol. He tells me he works at the [address] [employer] and routinely bends and lifts heavy packages. He denies any specific injury. Pain is well localized and does not radiate. Denies any abdominal pain. He denies any bowel or bladder symptoms. There has been no fever lately. He tells me he was at work today but the pain worsened and he left to seek care. He tells me he is uninsured and that causes difficulties following up with a specialist.  He does not have a primary care. There are no other complaints, changes, or physical findings at this time. PCP: None    Current Outpatient Medications   Medication Sig Dispense Refill    ibuprofen (MOTRIN) 600 mg tablet Take 1 Tablet by mouth every eight (8) hours as needed for Pain. 20 Tablet 0    cyclobenzaprine (FLEXERIL) 10 mg tablet Take 1 Tablet by mouth three (3) times daily as needed for Muscle Spasm(s). 20 Tablet 0     Past History     Past Medical History:  History reviewed. No pertinent past medical history. Past Surgical History:  History reviewed. No pertinent surgical history. Family History:  History reviewed. No pertinent family history.     Social History:  Social History     Tobacco Use    Smoking status: Current Some Day Smoker     Packs/day: 1.00    Smokeless tobacco: Never Used   Substance Use Topics    Alcohol use: Yes     Comment: socially    Drug use: Not Currently     Types: Marijuana       Allergies: Allergies   Allergen Reactions    Pcn [Penicillins] Anaphylaxis     Review of Systems   Review of Systems   Constitutional: Negative for fever. Gastrointestinal: Negative for abdominal pain. Musculoskeletal: Positive for back pain. Neurological: Negative for weakness and numbness. All other systems reviewed and are negative. Physical Exam   Physical Exam  Vitals and nursing note reviewed. Constitutional:       General: He is not in acute distress. Appearance: He is well-developed. He is not toxic-appearing. HENT:      Head: Normocephalic and atraumatic. No right periorbital erythema or left periorbital erythema. Right Ear: External ear normal.      Left Ear: External ear normal.      Nose: Nose normal.      Mouth/Throat:      Lips: No lesions. Eyes:      General: No scleral icterus. Conjunctiva/sclera: Conjunctivae normal.      Pupils: Pupils are equal, round, and reactive to light. Cardiovascular:      Rate and Rhythm: Normal rate. Pulmonary:      Effort: Pulmonary effort is normal. No respiratory distress. Abdominal:      Palpations: Abdomen is soft. Tenderness: There is no abdominal tenderness. Musculoskeletal:         General: Normal range of motion. Cervical back: Normal range of motion. Lumbar back: Tenderness present. Back:       Comments:   LOWER BACK:  No bruising, redness or swelling. No step off. Diffuse muscular discomfort. No CVA tenderness   Skin:     Findings: No rash. Neurological:      Mental Status: He is alert and oriented to person, place, and time. He is not disoriented. Cranial Nerves: No cranial nerve deficit. Sensory: No sensory deficit. Psychiatric:         Speech: Speech normal.       Diagnostic Study Results     Labs -   No results found for this or any previous visit (from the past 12 hour(s)).     Radiologic Studies -   No orders to display     CT Results  (Last 48 hours)    None        CXR Results  (Last 48 hours)    None        Medical Decision Making   I am the first provider for this patient. I reviewed the vital signs, available nursing notes, past medical history, past surgical history, family history and social history. Vital Signs-Reviewed the patient's vital signs. Patient Vitals for the past 12 hrs:   Temp Pulse Resp BP SpO2   01/19/22 1340 98.1 °F (36.7 °C) 97 19 (!) 140/82 100 %       Pulse Oximetry Analysis - 100% on RA    Records Reviewed: Nursing Notes, Old Medical Records, Previous Radiology Studies and Previous Laboratory Studies    Provider Notes (Medical Decision Making): Afebrile, well appearing, no new injury, presentation reflects an exacerbation of a chronic problem, plan as below     ED Course:   Initial assessment performed. The patients presenting problems have been discussed, and they are in agreement with the care plan formulated and outlined with them. I have encouraged them to ask questions as they arise throughout their visit. Disposition:  Discharge    PLAN:  1. Discharge Medication List as of 1/19/2022  3:37 PM      START taking these medications    Details   ibuprofen (MOTRIN) 600 mg tablet Take 1 Tablet by mouth every eight (8) hours as needed for Pain., Normal, Disp-20 Tablet, R-0      cyclobenzaprine (FLEXERIL) 10 mg tablet Take 1 Tablet by mouth three (3) times daily as needed for Muscle Spasm(s). , Normal, Disp-20 Tablet, R-0           2.    Follow-up Information     Follow up With Specialties Details Why [address]  Call  PRIMARY CARE: call to schedule follow up [address], [address] [facility] [address]  Call  PRIMARY CARE: or call to schedule follow up [address] [facility] [address]    Calvin Donnelly MD Orthopedic Surgery  ORTHO / SPINE: as needed if back pain symptoms persist [address] [facility]  [address]  [address] [id number] [phone]          Return to ED if worse     Diagnosis     Clinical Impression:   1.  Acute exacerbation of chronic low back pain

## 2022-01-19 NOTE — LETTER
[facility] EMERGENCY DEPT  [address] [address] [phone]    Work/School Note    Date: 1/19/2022    To Whom It May concern:    Ryder Hoang was seen and treated today in the emergency room by the following provider(s):  Attending Provider: Pravin Johnson MD  Physician Assistant: ASHWINI Leos. Ryder Hoang may return to work on [phone].     Sincerely,          ASHWINI Huber

## 2022-03-19 PROBLEM — F10.929 ALCOHOL INTOXICATION (HCC): Status: ACTIVE | Noted: 2019-01-11

## 2023-05-24 RX ORDER — IBUPROFEN 600 MG/1
600 TABLET ORAL EVERY 8 HOURS PRN
COMMUNITY
Start: 2022-01-19

## 2023-05-24 RX ORDER — CYCLOBENZAPRINE HCL 10 MG
10 TABLET ORAL 3 TIMES DAILY PRN
COMMUNITY
Start: 2022-01-19

## 2023-09-25 NOTE — ED NOTES
Discharge instructions were given to the patient by ASHWINI Weems. The patient left the Emergency Department ambulatory, alert and oriented and in no acute distress with 2 prescriptions. The patient was encouraged to call or return to the ED for worsening issues or problems and was encouraged to schedule a follow up appointment for continuing care. The patient verbalized understanding of discharge instructions and prescriptions, all questions were answered. The patient has no further concerns at this time. Detail Level: Generalized Detail Level: Detailed
